# Patient Record
Sex: MALE | Race: WHITE | HISPANIC OR LATINO | Employment: FULL TIME | URBAN - METROPOLITAN AREA
[De-identification: names, ages, dates, MRNs, and addresses within clinical notes are randomized per-mention and may not be internally consistent; named-entity substitution may affect disease eponyms.]

---

## 2017-11-14 ENCOUNTER — ALLSCRIPTS OFFICE VISIT (OUTPATIENT)
Dept: OTHER | Facility: OTHER | Age: 32
End: 2017-11-14

## 2017-11-30 ENCOUNTER — ALLSCRIPTS OFFICE VISIT (OUTPATIENT)
Dept: OTHER | Facility: OTHER | Age: 32
End: 2017-11-30

## 2017-12-05 NOTE — PROGRESS NOTES
Assessment    1  Tear of left biceps muscle, subsequent encounter (V58 89,840 8) (S46 112D)    Plan  Tear of left biceps muscle, subsequent encounter    · *1 - [facility]  *  Status: Active  Requested for:  94PEV6788  Care Summary provided  : Yes    Discussion/Summary    Needs stat referral to ortho surgical    Get MRI report to orthosurgical    Possible side effects of new medications were reviewed with the patient/guardian today  The treatment plan was reviewed with the patient/guardian  The patient/guardian understands and agrees with the treatment plan      Chief Complaint  Pt here for ER follow up for possibly Left Arm torn biceps      History of Present Illness  The patient is being seen for follow-up from an emergency room visit for shoulder sprain  was in and forearm flexion position for 45 min to one hour  Reached for a magazine and felt a lot of pooping sounds  Had immediate pain  Review of Systems    Cardiovascular: no chest pain  Respiratory: no cough  Musculoskeletal: joint swelling, but as noted in HPI  ROS reviewed  Active Problems    1  Abdominal bloating (787 3) (R14 0)   2  Abnormal weight gain (783 1) (R63 5)   3  Anxiety (300 00) (F41 9)   4  Arm pain, diffuse, right (729 5) (M79 601)   5  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)   6  Back pain (724 5) (M54 9)   7  Depression (311) (F32 9)   8  Dermatitis (692 9) (L30 9)   9  Disturbance of memory (780 93) (R41 3)   10  Flu vaccine need (V04 81) (Z23)   11  Insomnia (780 52) (G47 00)   12  Knee pain (719 46) (M25 569)   13  Melena (578 1) (K92 1)   14  Nausea without vomiting (787 02) (R11 0)   15  Need for Tdap vaccination (V06 1) (Z23)   16  Pain of both elbows (719 42) (M25 521,M25 522)   17  Screen for STD (sexually transmitted disease) (V74 5) (Z11 3)    Past Medical History    1  History of Pharyngitis, acute (462) (J02 9)   2   History of Shoulder pain (719 41) (M25 519)    The active problems and past medical history were reviewed and updated today  Surgical History    1  History of Ear Pressure Equalization Tube, Insertion   2  History of Hernia Repair    The surgical history was reviewed and updated today  Family History  Father    1  No pertinent family history    The family history was reviewed and updated today  Social History    · Caffeine use (V49 89) (F15 90)   · Exercises regularly   · Denied: History of domestic violence (V15 41)   · Never a smoker   · Social alcohol use (Z78 9)  The social history was reviewed and is unchanged  Current Meds   1  LamoTRIgine 100 MG Oral Tablet; take 1 tablet twice a day; Therapy: 94HIS0151 to (Evaluate:46Zrt1611)  Requested for: 55QTY2343; Last   Rx:12Ijl9171 Ordered    The medication list was reviewed and updated today  Allergies    1  No Known Drug Allergies    2  No Known Food Allergies    Vitals  Vital Signs    Recorded: 00MXN4180 02:18PM   Temperature 97 6 F, Temporal   Heart Rate 86   Respiration 16   Systolic 450, Sitting   Diastolic 86, Sitting   Height 6 ft 2 in   Weight 283 lb 4 oz   BMI Calculated 36 37   BSA Calculated 2 52   Pain Scale 4     Physical Exam    Constitutional   General appearance: No acute distress, well appearing and well nourished  Pulmonary   Respiratory effort: No increased work of breathing or signs of respiratory distress  Auscultation of lungs: Clear to auscultation, equal breath sounds bilaterally, no wheezes, no rales, no rhonci  Cardiovascular   Auscultation of heart: Normal rate and rhythm, normal S1 and S2, without murmurs  Musculoskeletal   Digits and nails: Normal without clubbing or cyanosis  Inspection/palpation of joints, bones, and muscles: Abnormal   Appearance - swelling (left bicep), ecchymosis (left bicep) and deformity (left bicep)  Palpation - tenderness (left bicep )   Left biceps examination deferred to ortho surgical    Psychiatric   Orientation to person, place and time: Normal     Mood and affect: Normal          Signatures   Electronically signed by : Fabrice Matute; Nov 30 2017  2:55PM EST                       (Author)

## 2017-12-14 ENCOUNTER — ALLSCRIPTS OFFICE VISIT (OUTPATIENT)
Dept: OTHER | Facility: OTHER | Age: 32
End: 2017-12-14

## 2017-12-14 DIAGNOSIS — S46.212A STRAIN OF MUSCLE, FASCIA AND TENDON OF OTHER PARTS OF BICEPS, LEFT ARM, INITIAL ENCOUNTER: ICD-10-CM

## 2017-12-15 NOTE — PROGRESS NOTES
Assessment  1  Traumatic rupture of left distal biceps tendon, initial encounter (841 8) (F56 675T)    Plan  Traumatic rupture of left distal biceps tendon, initial encounter    · Follow Up After Surgery Evaluation and Treatment  Follow-up  Status: Hold For -Scheduling  Requested for: 80EBJ3957   · *1 - [facility]  2 to 3 times a week for 12 weekslocal modalities at therapist discretionSling x 2 weeks ROM, stretching and strengthing per Distal Bicep Tendon repairthank you  Status: Active  Requested for: 24ANT0626  Care Summary provided  : Yes   · (1) CBC/PLT/DIFF; Status:Active - Retrospective Authorization; Requestedfor:00Tfr7910;     Discussion/Summary    The patient has a high-grade partial tear of his left distal biceps and is indicated for open repair  He will be scheduled for left distal biceps repair at his convenience, informed consent was obtained today after thorough discussion risks and benefits the procedure as well as alternatives to the procedure  History of Present Illness  HPI: Tim Ott is a 32 y/o LHD male who presents to the office with a chief complaint of left elbow pain  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  He went to end the arm quickly and felt a pop  ER diagnosed him with a distal bicep rupture  Further testing confirmed diagnosis  He is able to move the elbow with pain  Denies pain at night  He denies any issue with the left elbow prior to this work related accident  Review of Systems   Constitutional: No fever or chills, feels well, no tiredness, no recent weight loss or weight gain  Cardiovascular: No complaints of chest pain, no palpitations, no leg claudication or lower extremity edema  Respiratory: No complaints of shortness of breath, no wheezing, no cough  Gastrointestinal: No complaints of abdominal pain, no constipation, no nausea or vomiting, no diarrhea or bloody stools    Genitourinary: No complaints of dysuria or incontinence, no hesitancy, no nocturia  Musculoskeletal: as noted in HPI  Integumentary: No complaints of skin rash or lesion, no itching or dry skin, no skin wounds  Neurological: No complaints of headache, no confusion, no numbness or tingling, no dizziness  ROS reviewed  Active Problems  1  Abdominal bloating (787 3) (R14 0)   2  Abnormal weight gain (783 1) (R63 5)   3  Anxiety (300 00) (F41 9)   4  Arm pain, diffuse, right (729 5) (M79 601)   5  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)   6  Back pain (724 5) (M54 9)   7  Depression (311) (F32 9)   8  Dermatitis (692 9) (L30 9)   9  Disturbance of memory (780 93) (R41 3)   10  Flu vaccine need (V04 81) (Z23)   11  Insomnia (780 52) (G47 00)   12  Knee pain (719 46) (M25 569)   13  Melena (578 1) (K92 1)   14  Nausea without vomiting (787 02) (R11 0)   15  Need for Tdap vaccination (V06 1) (Z23)   16  Pain of both elbows (719 42) (M25 521,M25 522)   17  Screen for STD (sexually transmitted disease) (V74 5) (Z11 3)   18  Tear of left biceps muscle, subsequent encounter (V58 89,840 8) (S46 112D)   19  Traumatic rupture of left distal biceps tendon, initial encounter (841 8) (C18 647E)    Past Medical History   · History of Pharyngitis, acute (462) (J02 9)   · History of Shoulder pain (719 41) (M25 519)    The active problems and past medical history were reviewed and updated today  Surgical History   · History of Ear Pressure Equalization Tube, Insertion   · History of Hernia Repair    The surgical history was reviewed and updated today  Family History  Father    · No pertinent family history    The family history was reviewed and updated today  Social History   · Caffeine use (V49 89) (F15 90)   · Exercises regularly   · Denied: History of domestic violence (V15 41)   · Never a smoker   · Social alcohol use (Z78 9)  The social history was reviewed and updated today  Current Meds   1   Ibuprofen 200 MG Oral Tablet Recorded   2  LamoTRIgine 100 MG Oral Tablet; take 1 tablet twice a day; Therapy: 92KMC9679 to (Evaluate:72Vdk3399)  Requested for: 54QGI2582; Last Rx:07Jun2017 Ordered    The medication list was reviewed and updated today  Allergies  1  No Known Drug Allergies  2  No Known Food Allergies    Vitals   Recorded: 47FDY9958 09:54AM   Heart Rate 73   Systolic 715, Sitting   Diastolic 83, Sitting   Weight 296 lb    BMI Calculated 38   BSA Calculated 2 57     Physical Exam    Left Elbow: Appearance: ecchymosis, but-- no effusion-- and-- no erythema  Tenderness: radial head  Palpatory findings include a distal biceps defect  ROM: Full  Pronation: painful 4/5  Supination: painful 4/5  Special Tests: + active biceps test    Left Shoulder: no deformity, erythema, ecchymosis, edema, or tenderness,-- ROM within normal limits in all planes-- and-- strength within normal limits in all planes  Constitutional - General appearance: Normal   Musculoskeletal - Muscle strength/tone: Normal -- Upper extremity compartments: Normal   Cardiovascular - Pulses: Normal   Abdomen - Abdomen - Soft, nontender, nondistended  Neurologic - Sensation: Normal -- Upper extremity peripheral neuro exam: Normal   Psychiatric - Orientation to person, place, and time: Normal -- Mood and affect: Normal   Eyes  Pupils and irises: Normal    Free Text - Heart: no discernible arrhythmia, +S1, K9vbekc: No audible wheezing or stridor; bilateral chest rise  Results/Data  I personally reviewed the films/images/results in the office today  My interpretation follows  MRI Review MRI left elbow near complete rupture of the distal biceps  Message  Return to work or school:   Vicky Osuna is under my professional care  He was seen in my office on 12/14/2017       Hillary Mancera will be having surgery on his left elbow  Expected initial time off of work will be between 4-6 weeks depending on acute recovery   He may be able to return with restrictions between 4 and 6 weeks  Expected return to full duty 3 to 4 5 months  Nhung Singleton PA-C  Future Appointments    Date/Time Provider Specialty Site   12/26/2017 08:30 AM [facility] DELIA Welch   [address] [place] [address] OR   12/28/2017 10:00 AM Carlo Camarena, [facility] Orthopedic Surgery [address]     Signatures   Electronically signed by : ALMA DELIA Dixon ; Dec 14 2017 11:57AM EST                       (Author)

## 2017-12-21 ENCOUNTER — APPOINTMENT (OUTPATIENT)
Dept: PHYSICAL THERAPY | Facility: CLINIC | Age: 32
End: 2017-12-21
Payer: OTHER MISCELLANEOUS

## 2017-12-21 DIAGNOSIS — S46.212A STRAIN OF MUSCLE, FASCIA AND TENDON OF OTHER PARTS OF BICEPS, LEFT ARM, INITIAL ENCOUNTER: ICD-10-CM

## 2017-12-21 PROCEDURE — G8990 OTHER PT/OT CURRENT STATUS: HCPCS

## 2017-12-21 PROCEDURE — G8991 OTHER PT/OT GOAL STATUS: HCPCS

## 2017-12-21 PROCEDURE — 97162 PT EVAL MOD COMPLEX 30 MIN: CPT

## 2017-12-26 ENCOUNTER — ANESTHESIA (OUTPATIENT)
Dept: PERIOP | Facility: HOSPITAL | Age: 32
End: 2017-12-26
Payer: OTHER MISCELLANEOUS

## 2017-12-26 ENCOUNTER — HOSPITAL ENCOUNTER (OUTPATIENT)
Facility: HOSPITAL | Age: 32
Setting detail: OUTPATIENT SURGERY
Discharge: HOME | End: 2017-12-26
Attending: ORTHOPAEDIC SURGERY | Admitting: ORTHOPAEDIC SURGERY
Payer: OTHER MISCELLANEOUS

## 2017-12-26 ENCOUNTER — GENERIC CONVERSION - ENCOUNTER (OUTPATIENT)
Dept: OTHER | Facility: OTHER | Age: 32
End: 2017-12-26

## 2017-12-26 ENCOUNTER — ANESTHESIA EVENT (OUTPATIENT)
Dept: PERIOP | Facility: HOSPITAL | Age: 32
End: 2017-12-26
Payer: OTHER MISCELLANEOUS

## 2017-12-26 ENCOUNTER — HOSPITAL ENCOUNTER (OUTPATIENT)
Dept: RADIOLOGY | Facility: HOSPITAL | Age: 32
Setting detail: OUTPATIENT SURGERY
Discharge: HOME | End: 2017-12-26
Payer: OTHER MISCELLANEOUS

## 2017-12-26 VITALS
RESPIRATION RATE: 16 BRPM | HEIGHT: 74 IN | SYSTOLIC BLOOD PRESSURE: 113 MMHG | DIASTOLIC BLOOD PRESSURE: 59 MMHG | TEMPERATURE: 99 F | HEART RATE: 51 BPM | OXYGEN SATURATION: 97 % | BODY MASS INDEX: 36.57 KG/M2 | WEIGHT: 285 LBS

## 2017-12-26 VITALS — DIASTOLIC BLOOD PRESSURE: 76 MMHG | SYSTOLIC BLOOD PRESSURE: 122 MMHG | OXYGEN SATURATION: 99 % | HEART RATE: 70 BPM

## 2017-12-26 DIAGNOSIS — S46.212A STRAIN OF MUSCLE, FASCIA AND TENDON OF OTHER PARTS OF BICEPS, LEFT ARM, INITIAL ENCOUNTER: ICD-10-CM

## 2017-12-26 DIAGNOSIS — S46.212D RUPTURE OF DISTAL BICEPS TENDON, LEFT, SUBSEQUENT ENCOUNTER: Primary | ICD-10-CM

## 2017-12-26 LAB
HBV SURFACE AG SER QL: NORMAL
HCV AB SER QL: NORMAL
HIV 1+2 AB+HIV1 P24 AG SERPL QL IA: NORMAL
HIV1 P24 AG SER QL: NORMAL

## 2017-12-26 PROCEDURE — 86803 HEPATITIS C AB TEST: CPT | Performed by: ORTHOPAEDIC SURGERY

## 2017-12-26 PROCEDURE — 87340 HEPATITIS B SURFACE AG IA: CPT | Performed by: ORTHOPAEDIC SURGERY

## 2017-12-26 PROCEDURE — 87806 HIV AG W/HIV1&2 ANTB W/OPTIC: CPT | Performed by: ORTHOPAEDIC SURGERY

## 2017-12-26 PROCEDURE — C1713 ANCHOR/SCREW BN/BN,TIS/BN: HCPCS | Performed by: ORTHOPAEDIC SURGERY

## 2017-12-26 PROCEDURE — 73070 X-RAY EXAM OF ELBOW: CPT

## 2017-12-26 DEVICE — SYSTEM IMPLANT DSTL BICEP REPAIR: Type: IMPLANTABLE DEVICE | Site: ARM | Status: FUNCTIONAL

## 2017-12-26 RX ORDER — LIDOCAINE HYDROCHLORIDE 10 MG/ML
INJECTION, SOLUTION INFILTRATION; PERINEURAL AS NEEDED
Status: DISCONTINUED | OUTPATIENT
Start: 2017-12-26 | End: 2017-12-26 | Stop reason: SURG

## 2017-12-26 RX ORDER — ONDANSETRON 2 MG/ML
4 INJECTION INTRAMUSCULAR; INTRAVENOUS EVERY 6 HOURS PRN
Status: DISCONTINUED | OUTPATIENT
Start: 2017-12-26 | End: 2017-12-26 | Stop reason: HOSPADM

## 2017-12-26 RX ORDER — OXYCODONE HYDROCHLORIDE 5 MG/1
5 TABLET ORAL EVERY 4 HOURS PRN
Status: DISCONTINUED | OUTPATIENT
Start: 2017-12-26 | End: 2017-12-26 | Stop reason: HOSPADM

## 2017-12-26 RX ORDER — SODIUM CHLORIDE, SODIUM LACTATE, POTASSIUM CHLORIDE, CALCIUM CHLORIDE 600; 310; 30; 20 MG/100ML; MG/100ML; MG/100ML; MG/100ML
20 INJECTION, SOLUTION INTRAVENOUS CONTINUOUS
Status: DISCONTINUED | OUTPATIENT
Start: 2017-12-26 | End: 2017-12-26

## 2017-12-26 RX ORDER — SODIUM CHLORIDE, SODIUM LACTATE, POTASSIUM CHLORIDE, CALCIUM CHLORIDE 600; 310; 30; 20 MG/100ML; MG/100ML; MG/100ML; MG/100ML
125 INJECTION, SOLUTION INTRAVENOUS CONTINUOUS
Status: DISCONTINUED | OUTPATIENT
Start: 2017-12-26 | End: 2017-12-26 | Stop reason: HOSPADM

## 2017-12-26 RX ORDER — ACETAMINOPHEN 325 MG/1
650 TABLET ORAL EVERY 6 HOURS PRN
Status: DISCONTINUED | OUTPATIENT
Start: 2017-12-26 | End: 2017-12-26 | Stop reason: HOSPADM

## 2017-12-26 RX ORDER — MAGNESIUM HYDROXIDE 1200 MG/15ML
LIQUID ORAL AS NEEDED
Status: DISCONTINUED | OUTPATIENT
Start: 2017-12-26 | End: 2017-12-26 | Stop reason: HOSPADM

## 2017-12-26 RX ORDER — FENTANYL CITRATE 50 UG/ML
INJECTION, SOLUTION INTRAMUSCULAR; INTRAVENOUS
Status: COMPLETED
Start: 2017-12-26 | End: 2017-12-26

## 2017-12-26 RX ORDER — ONDANSETRON 2 MG/ML
INJECTION INTRAMUSCULAR; INTRAVENOUS AS NEEDED
Status: DISCONTINUED | OUTPATIENT
Start: 2017-12-26 | End: 2017-12-26 | Stop reason: SURG

## 2017-12-26 RX ORDER — MIDAZOLAM HYDROCHLORIDE 1 MG/ML
INJECTION INTRAMUSCULAR; INTRAVENOUS
Status: COMPLETED
Start: 2017-12-26 | End: 2017-12-26

## 2017-12-26 RX ORDER — GLYCOPYRROLATE 0.2 MG/ML
INJECTION INTRAMUSCULAR; INTRAVENOUS AS NEEDED
Status: DISCONTINUED | OUTPATIENT
Start: 2017-12-26 | End: 2017-12-26 | Stop reason: SURG

## 2017-12-26 RX ORDER — PROPOFOL 10 MG/ML
INJECTION, EMULSION INTRAVENOUS AS NEEDED
Status: DISCONTINUED | OUTPATIENT
Start: 2017-12-26 | End: 2017-12-26 | Stop reason: SURG

## 2017-12-26 RX ORDER — LAMOTRIGINE 100 MG/1
100 TABLET ORAL 2 TIMES DAILY
COMMUNITY
End: 2018-06-14 | Stop reason: SDUPTHER

## 2017-12-26 RX ORDER — FENTANYL CITRATE 50 UG/ML
INJECTION, SOLUTION INTRAMUSCULAR; INTRAVENOUS AS NEEDED
Status: DISCONTINUED | OUTPATIENT
Start: 2017-12-26 | End: 2017-12-26 | Stop reason: SURG

## 2017-12-26 RX ORDER — ONDANSETRON 2 MG/ML
4 INJECTION INTRAMUSCULAR; INTRAVENOUS ONCE AS NEEDED
Status: DISCONTINUED | OUTPATIENT
Start: 2017-12-26 | End: 2017-12-26 | Stop reason: HOSPADM

## 2017-12-26 RX ORDER — MIDAZOLAM HYDROCHLORIDE 1 MG/ML
INJECTION INTRAMUSCULAR; INTRAVENOUS AS NEEDED
Status: DISCONTINUED | OUTPATIENT
Start: 2017-12-26 | End: 2017-12-26 | Stop reason: SURG

## 2017-12-26 RX ORDER — OXYCODONE HYDROCHLORIDE 5 MG/1
TABLET ORAL
Qty: 30 TABLET | Refills: 0 | Status: SHIPPED | OUTPATIENT
Start: 2017-12-26 | End: 2018-05-11

## 2017-12-26 RX ORDER — OXYCODONE HYDROCHLORIDE 10 MG/1
10 TABLET ORAL EVERY 4 HOURS PRN
Status: DISCONTINUED | OUTPATIENT
Start: 2017-12-26 | End: 2017-12-26 | Stop reason: HOSPADM

## 2017-12-26 RX ORDER — FENTANYL CITRATE/PF 50 MCG/ML
25 SYRINGE (ML) INJECTION
Status: DISCONTINUED | OUTPATIENT
Start: 2017-12-26 | End: 2017-12-26 | Stop reason: HOSPADM

## 2017-12-26 RX ADMIN — PROPOFOL 300 MG: 10 INJECTION, EMULSION INTRAVENOUS at 10:50

## 2017-12-26 RX ADMIN — MIDAZOLAM HYDROCHLORIDE 2 MG: 1 INJECTION, SOLUTION INTRAMUSCULAR; INTRAVENOUS at 10:31

## 2017-12-26 RX ADMIN — SODIUM CHLORIDE, SODIUM LACTATE, POTASSIUM CHLORIDE, AND CALCIUM CHLORIDE: .6; .31; .03; .02 INJECTION, SOLUTION INTRAVENOUS at 11:06

## 2017-12-26 RX ADMIN — DEXAMETHASONE SODIUM PHOSPHATE 10 MG: 10 INJECTION INTRAMUSCULAR; INTRAVENOUS at 10:50

## 2017-12-26 RX ADMIN — GLYCOPYRROLATE 0.2 MG: 0.2 INJECTION, SOLUTION INTRAMUSCULAR; INTRAVENOUS at 10:50

## 2017-12-26 RX ADMIN — SODIUM CHLORIDE, SODIUM LACTATE, POTASSIUM CHLORIDE, AND CALCIUM CHLORIDE: .6; .31; .03; .02 INJECTION, SOLUTION INTRAVENOUS at 10:00

## 2017-12-26 RX ADMIN — FENTANYL CITRATE 50 MCG: 50 INJECTION, SOLUTION INTRAMUSCULAR; INTRAVENOUS at 10:31

## 2017-12-26 RX ADMIN — FENTANYL CITRATE 50 MCG: 50 INJECTION, SOLUTION INTRAMUSCULAR; INTRAVENOUS at 11:05

## 2017-12-26 RX ADMIN — CEFAZOLIN SODIUM 2000 MG: 2 SOLUTION INTRAVENOUS at 10:55

## 2017-12-26 RX ADMIN — LIDOCAINE HYDROCHLORIDE 50 MG: 10 INJECTION, SOLUTION INFILTRATION; PERINEURAL at 10:50

## 2017-12-26 RX ADMIN — ONDANSETRON 4 MG: 2 INJECTION INTRAMUSCULAR; INTRAVENOUS at 10:50

## 2017-12-26 RX ADMIN — OXYCODONE HYDROCHLORIDE 10 MG: 10 TABLET ORAL at 13:08

## 2017-12-26 RX ADMIN — CEFAZOLIN SODIUM 1000 MG: 1 SOLUTION INTRAVENOUS at 10:55

## 2017-12-26 NOTE — ANESTHESIA PROCEDURE NOTES
Peripheral Block    Patient location during procedure: holding area  Start time: 12/26/2017 10:20 AM  Reason for block: at surgeon's request and post-op pain management  Staffing  Anesthesiologist: Betsy Turner  Performed: anesthesiologist   Preanesthetic Checklist  Completed: patient identified, site marked, surgical consent, pre-op evaluation, timeout performed, IV checked, risks and benefits discussed and monitors and equipment checked  Peripheral Block  Patient position: supine  Prep: ChloraPrep  Patient monitoring: continuous pulse ox and frequent blood pressure checks  Block type: supraclavicular  Laterality: left  Injection technique: single-shot  Procedures: ultrasound guided  ultrasound permanent image saved    Local infiltration: ropivacaine (1:400K epi)  Infiltration strength: 0 5 %  Dose: 20 mL  Needle  Needle type: Stimuplex   Needle gauge: 22 G  Needle length: 5 cm  Needle localization: ultrasound guidance  Assessment  Injection assessment: incremental injection, local visualized surrounding nerve on ultrasound, negative aspiration for CSF, negative aspiration for heme and no paresthesia on injection  Heart rate change: no  Slow fractionated injection: yes  Post-procedure:  site cleaned  patient tolerated the procedure well with no immediate complications

## 2017-12-26 NOTE — DISCHARGE INSTRUCTIONS
Discharge Instructions - Orthopedics  [facility] 33 y o  male MRN: [id number]  Unit/Bed#: APU 16    Weight Bearing Status:                                           No bearing weight on left arm  Left arm in sling  Okay to move left SHOULDER, okay to gently move elbow   Do not lift or hold anything in the left hand    Pain:  Continue analgesics as directed  · Ice (20 minutes on and 20 minutes off)  · Tylenol (2 pills every 8 hours)  · Ibuprofen (600 mg every 6 hours)  · Oxycodone - for severe pain only  Limited supply    Dressing Instructions:   Keep dressing clean, dry and intact until follow up appointment  Do not shower until post-operative day #4    PT/OT:  Continue PT/OT on outpatient basis as directed    Appt Instructions: If you do not have your appointment, please call the clinic at [phone] to f/u with Dr Emilee Ortiz as scheduled    Contact the office sooner if you experience any increased numbness/tingling in the extremities, fever greater than 102, uncontrolled pain with the above recommendations

## 2017-12-26 NOTE — ANESTHESIA POSTPROCEDURE EVALUATION
Post-Op Assessment Note      CV Status:  Stable    Mental Status:  Alert and awake    Hydration Status:  Euvolemic    PONV Controlled:  Controlled    Airway Patency:  Patent    Post Op Vitals Reviewed: Yes          Staff: CRNA, Anesthesiologist           /78 (12/26/17 1215)    Temp   99 4   Pulse (!) 52 (12/26/17 1215)   Resp 12 (12/26/17 1215)    SpO2 99 % (12/26/17 1215)

## 2017-12-26 NOTE — ANESTHESIA PREPROCEDURE EVALUATION
Review of Systems/Medical History    Chart reviewed  No history of anesthetic complications     Cardiovascular  Negative cardio ROS Exercise tolerance: good, Exercise comment: METS>4    Pulmonary  Negative pulmonary ROS ,        GI/Hepatic  Negative GI/hepatic ROS          Negative  ROS        Endo/Other  Negative endo/other ROS      GYN       Hematology  Negative hematology ROS      Musculoskeletal  Negative musculoskeletal ROS        Neurology  Negative neurology ROS      Psychology   Anxiety,            Physical Exam    Airway    Mallampati score: I  TM Distance: >3 FB  Neck ROM: full     Dental   No notable dental hx     Cardiovascular  Comment: Negative ROS,     Pulmonary      Other Findings        Anesthesia Plan  ASA Score- 1     Anesthesia Type- general and regional with ASA Monitors  Additional Monitors:   Airway Plan: LMA  Comment: GA + L supraclav block/catheter  Plan Factors-  Patient did not smoke on day of surgery  Induction- intravenous  Postoperative Plan-     Informed Consent- Anesthetic plan and risks discussed with patient  I personally reviewed this patient with the CRNA  Discussed and agreed on the Anesthesia Plan with the CRNA  Georgette Ormond

## 2017-12-26 NOTE — OP NOTE
OPERATIVE REPORT  PATIENT NAME: Louise Peterson    :  1985  MRN: [id number]  Pt Location: [facility] OR ROOM 15    SURGERY DATE: 2017     SURGEON: Zina Tyson MD     ASSISTANT: Rob Peña PA-C     NOTE: Rob Peña PA-C was present throughout the entire procedure and performed essential assistance with patient prepping, draping, positioning, biceps reduction, wound closure, sterile dressing application and sling application, all under my direct supervision  NOTE: No qualified resident physician was available for assistance    PREOPERATIVE DIAGNOSIS:  Left Distal Biceps Rupture    POSTOPERATIVE DIAGNOSIS:  Same    PROCEDURES:  Left Distal Biceps Repair    ANESTHESIA STAFF: Brooklynn Carrillo MD     ANESTHESIA TYPE: General with LMA with Supraclavicular Block    COMPLICATIONS: None    FINDINGS: High Grade Partial Distal Biceps Rupture    SPECIMEN(S):  None    ESTIMATED BLOOD LOSS: Minimal    INDICATIONS FOR PROCEDURE:  The patient is a 33 y o  male presenting with pain and lack of function secondary to a distal biceps rupture of the left elbow  After a thorough discussion of the risks and benefits of operative and nonoperative care the patient elected for left distal biceps repair  Informed consent was obtained in the office  OPERATIVE TECHNIQUE:  The day of surgery I identified the patient's left elbow and marked it with my initials  The patient was taken back to the operating room where general anesthesia  was induced by the anesthesia staff without complication  Regional block was preformed in the pre-op holding area by anesthesia under ultrasound guidance for post-op pain control  The patient was placed in the supine position with the left arm on a hand table  The left elbow was prepped and draped in routine sterile fashion and after a time-out for safety and confirming 2 grams of IV Cefazolin were given, a tourniquet was inflated to 225 mmHg with total time 31 minutes    A transverse incision distal to the flexion crease was made  Blunt dissection was carried proximally and the distal end of the biceps tendon which had a few fibers still attached to the radius, these were released confirming the high-grade partial tear and the tendon edge was retrieved through the incision  There was moderate tenosynovitis and the tendon was debrided to a stable border  A whipstitch with an Arthrex fiber loop was then placed in the distal biceps tendon  Attention was then turned to the biceps insertion on the radial tuberosity, following along the anatomic tract of the biceps tendon the tuberosity was identified and gentle retraction was performed to expose the bicipital tuberosity for an anatomic positioning of the repair  Care was taken to avoid injury to the neurovascular structures during the approach and retraction  After identifying the radial tuberosity a guidewire was placed bicortically  A 6 5 mm reamer from the Arthrex system was then used to ream the proximal cortex surface to allow for healing, a socket was not drilled, the whipstitch was then placed in the Arthrex biceps button and the button was deployed past the distal cortex and flipped  This was confirmed under fluoroscopy  The biceps tendon was then brought into apposition with the cortex and tied off with a non-sliding knot  The elbow was brought through a range of motion with minimal tension to the repair and no gapping  The area was then irrigated and the subdermal layer with 3-0 Vicryl with 4-0 Monocryl for skin  Sterile dressings and a sling was placed  The patient was awoken  The patient was transported to the recovery room in good condition and will be admitted for post-operative care and physical therapy will be initiated following the standard distal biceps repair protocol      SIGNATURE: Laurie Fish MD  DATE: December 26, 2017  TIME: 11:57 AM

## 2017-12-28 ENCOUNTER — ALLSCRIPTS OFFICE VISIT (OUTPATIENT)
Dept: OTHER | Facility: OTHER | Age: 32
End: 2017-12-28

## 2017-12-29 ENCOUNTER — APPOINTMENT (OUTPATIENT)
Dept: PHYSICAL THERAPY | Facility: CLINIC | Age: 32
End: 2017-12-29
Payer: OTHER MISCELLANEOUS

## 2017-12-29 PROCEDURE — 97110 THERAPEUTIC EXERCISES: CPT

## 2017-12-29 PROCEDURE — 97140 MANUAL THERAPY 1/> REGIONS: CPT

## 2018-01-03 ENCOUNTER — APPOINTMENT (OUTPATIENT)
Dept: PHYSICAL THERAPY | Facility: CLINIC | Age: 33
End: 2018-01-03
Payer: OTHER MISCELLANEOUS

## 2018-01-03 PROCEDURE — 97140 MANUAL THERAPY 1/> REGIONS: CPT

## 2018-01-03 PROCEDURE — 97110 THERAPEUTIC EXERCISES: CPT

## 2018-01-04 ENCOUNTER — APPOINTMENT (OUTPATIENT)
Dept: PHYSICAL THERAPY | Facility: CLINIC | Age: 33
End: 2018-01-04
Payer: OTHER MISCELLANEOUS

## 2018-01-04 PROCEDURE — 97140 MANUAL THERAPY 1/> REGIONS: CPT

## 2018-01-04 PROCEDURE — 97110 THERAPEUTIC EXERCISES: CPT

## 2018-01-05 NOTE — PROGRESS NOTES
Assessment   1  Aftercare following surgery (V58 89) (Z48 89)    Plan   Aftercare following surgery    · Ibuprofen 600 MG Oral Tablet; TAKE 1 TABLET 3 TIMES DAILY   · Follow-up visit in 6 weeks Evaluation and Treatment  Follow-up  Status: Complete  Done:    47QGW3836  Traumatic rupture of left distal biceps tendon, initial encounter    · *1 - [facility]  2 to 3 times a week for 12 weeks    local modalities at therapist discretion    Sling x 2 weeks     No elbow strengthening until 6 weeks    ROM, stretching and strengthing per Distal Bicep Tendon repair    thank you  Status: Active  Requested for: 79ENU6769  Care Summary provided  : Yes    Discussion/Summary      Andrés Camarena Is doing well postoperatively  He will start physical therapy tomorrow  We will check his progress in 6 weeks  He will remain out of work until we see him back in the office  If he has any issues, questions or concerns coming call the office  All questions answered  Refill of ibuprofen was given for pain relief  Post-Op   Post-Op Elbow St ke:      Marcelyn Aase is status post distal biceps repair of the left elbow on 12/26/2017  HPI: The patient reports no excessive pain,-- no swelling,-- no fever,-- no nausea,-- no excessive bleeding-- and-- no shortness of breath  PE: The surgical incision site was clean, dry and intact  The elbow demonstrates ecchymosis-- and-- tenderness, but-- no warmth,-- no induration,-- no erythema-- and-- no swelling  ROM as expected given post-op status  Peripheral neurovascular exam reveals intact sensation to light touch-- and-- intact gross motor function  Assessment: Post-op, the patient is doing well,-- has excellent pain control-- and-- is showing no signs of infection  Plan: Activity Restrictions: advance as tolerated-- and-- per protocol  PT Referral:  I hereby certify that these services are medically necessary  Follow up: 6 weeks        Active Problems   1  Abdominal bloating (787 3) (R14 0)   2  Abnormal weight gain (783 1) (R63 5)   3  Aftercare following surgery (V58 89) (Z48 89)   4  Anxiety (300 00) (F41 9)   5  Arm pain, diffuse, right (729 5) (M79 601)   6  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)   7  Back pain (724 5) (M54 9)   8  Depression (311) (F32 9)   9  Dermatitis (692 9) (L30 9)   10  Disturbance of memory (780 93) (R41 3)   11  Flu vaccine need (V04 81) (Z23)   12  Insomnia (780 52) (G47 00)   13  Knee pain (719 46) (M25 569)   14  Melena (578 1) (K92 1)   15  Nausea without vomiting (787 02) (R11 0)   16  Need for Tdap vaccination (V06 1) (Z23)   17  Pain of both elbows (719 42) (M25 521,M25 522)   18  Screen for STD (sexually transmitted disease) (V74 5) (Z11 3)   19  Tear of left biceps muscle, subsequent encounter (V58 89,840 8) (S46 112D)   20  Traumatic rupture of left distal biceps tendon, initial encounter (841 8) (V81 823U)    Social History    · Caffeine use (V49 89) (F15 90)   · Exercises regularly   · Denied: History of domestic violence (V15 41)   · Never a smoker   · Social alcohol use (Z78 9)    Current Meds    1  LamoTRIgine 100 MG Oral Tablet; take 1 tablet twice a day; Therapy: 86FHZ5209 to (Evaluate:50Qrj8243)  Requested for: 99QFI5214; Last     Rx:07Jun2017 Ordered   2  OxyCODONE HCl TABS; Therapy: (Recorded:03Ggr1967) to Recorded    Allergies   1  No Known Drug Allergies  2  No Known Food Allergies    Vitals    Recorded: 28Dec2017 10:15AM   Heart Rate 64   Systolic 078, Sitting   Diastolic 83, Sitting   Weight 310 lb 4 oz   BMI Calculated 39 83   BSA Calculated 2 62     Message   Return to work or school:    William Bhatt is under my professional care  He was seen in my office on 12/28/2017     He is not able to return to work until cleared by orthopedics      Sabas Veliz will remain out of work over this next 6 weeks until we see him back in the office for re-evaluation        José Howard PA-C  Future Appointments      Date/Time Provider Specialty Site   02/08/2018 05:00 PM ALMA DELIA Ramsey   Orthopedic Surgery [facility]     Signatures    Electronically signed by : Jack Venegas [facility]; Dec 28 2017 10:49AM EST                       (Author)     Electronically signed by : ALMA DELIA Kang ; Jan 4 2018 10:23AM EST                       (Author)

## 2018-01-09 ENCOUNTER — APPOINTMENT (OUTPATIENT)
Dept: PHYSICAL THERAPY | Facility: CLINIC | Age: 33
End: 2018-01-09
Payer: OTHER MISCELLANEOUS

## 2018-01-09 PROCEDURE — 97110 THERAPEUTIC EXERCISES: CPT

## 2018-01-09 PROCEDURE — 97140 MANUAL THERAPY 1/> REGIONS: CPT

## 2018-01-10 NOTE — MISCELLANEOUS
Message  Return to work or school:   Carrie Vega is under my professional care  He was seen in my office on 11/14/2017   He is able to return to work on  11/15/2017    He is able to work with limitations (8 hrs a day/40 hrs week)           Signatures   Electronically signed by : Kat Moscoso DO; Nov 21 2017 11:27AM EST                       (Author)

## 2018-01-10 NOTE — MISCELLANEOUS
Message  Return to work or school:   Alicia Rodriguez is under my professional care   He was seen in my office on 11/30/2017   He is able to return to work on  undetermined   He is not able to return to work until evaluated by ortho surgical     Patient has a serious injury which requires medical attention and evaluation by specialist         Signatures   Electronically signed by : Jenny Mcadams; Nov 30 2017  2:56PM EST                       (Author)

## 2018-01-11 ENCOUNTER — APPOINTMENT (OUTPATIENT)
Dept: PHYSICAL THERAPY | Facility: CLINIC | Age: 33
End: 2018-01-11
Payer: OTHER MISCELLANEOUS

## 2018-01-11 PROCEDURE — 97110 THERAPEUTIC EXERCISES: CPT

## 2018-01-11 PROCEDURE — 97140 MANUAL THERAPY 1/> REGIONS: CPT

## 2018-01-12 ENCOUNTER — GENERIC CONVERSION - ENCOUNTER (OUTPATIENT)
Dept: OTHER | Facility: OTHER | Age: 33
End: 2018-01-12

## 2018-01-12 VITALS
TEMPERATURE: 97.6 F | RESPIRATION RATE: 16 BRPM | DIASTOLIC BLOOD PRESSURE: 86 MMHG | BODY MASS INDEX: 36.35 KG/M2 | WEIGHT: 283.25 LBS | SYSTOLIC BLOOD PRESSURE: 116 MMHG | HEART RATE: 86 BPM | HEIGHT: 74 IN

## 2018-01-13 VITALS
HEIGHT: 74 IN | SYSTOLIC BLOOD PRESSURE: 124 MMHG | RESPIRATION RATE: 18 BRPM | HEART RATE: 78 BPM | TEMPERATURE: 96.6 F | WEIGHT: 283.38 LBS | DIASTOLIC BLOOD PRESSURE: 82 MMHG | BODY MASS INDEX: 36.37 KG/M2

## 2018-01-13 NOTE — MISCELLANEOUS
Message  Return to work or school:   Zafar Lou is under my professional care  He was seen in my office on 6/ 24/16     He is able to work with limitations (8 hour/day start 6/25/16)           Signatures   Electronically signed by : Nathaniel Jimenez DO; Jun 24 2016 10:14AM EST                       (Author)    Electronically signed by : Nathaniel Jimenez DO; Jun 25 2016 11:34AM EST                       (Author)

## 2018-01-16 ENCOUNTER — APPOINTMENT (OUTPATIENT)
Dept: PHYSICAL THERAPY | Facility: CLINIC | Age: 33
End: 2018-01-16
Payer: OTHER MISCELLANEOUS

## 2018-01-16 PROCEDURE — 97140 MANUAL THERAPY 1/> REGIONS: CPT

## 2018-01-16 PROCEDURE — 97110 THERAPEUTIC EXERCISES: CPT

## 2018-01-16 NOTE — MISCELLANEOUS
Message  Return to work or school:   William Bhatt is under my professional care   He was seen in my office on 10/7/16    He is not able to return to work until 10/11/16           Signatures   Electronically signed by : Izaiah Gabriel DO; Oct  7 2016  9:18AM EST                       (Author)    Electronically signed by : Izaiah Gabriel DO; Oct  9 2016  7:41PM EST                       (Author)

## 2018-01-18 ENCOUNTER — APPOINTMENT (OUTPATIENT)
Dept: PHYSICAL THERAPY | Facility: CLINIC | Age: 33
End: 2018-01-18
Payer: OTHER MISCELLANEOUS

## 2018-01-18 PROCEDURE — 97140 MANUAL THERAPY 1/> REGIONS: CPT

## 2018-01-18 PROCEDURE — 97110 THERAPEUTIC EXERCISES: CPT

## 2018-01-22 VITALS
DIASTOLIC BLOOD PRESSURE: 83 MMHG | WEIGHT: 310.25 LBS | HEART RATE: 64 BPM | BODY MASS INDEX: 39.83 KG/M2 | SYSTOLIC BLOOD PRESSURE: 122 MMHG

## 2018-01-22 VITALS
BODY MASS INDEX: 38 KG/M2 | WEIGHT: 296 LBS | SYSTOLIC BLOOD PRESSURE: 133 MMHG | DIASTOLIC BLOOD PRESSURE: 83 MMHG | HEART RATE: 73 BPM

## 2018-01-23 ENCOUNTER — APPOINTMENT (OUTPATIENT)
Dept: PHYSICAL THERAPY | Facility: CLINIC | Age: 33
End: 2018-01-23
Payer: OTHER MISCELLANEOUS

## 2018-01-23 PROCEDURE — 97140 MANUAL THERAPY 1/> REGIONS: CPT

## 2018-01-23 PROCEDURE — 97110 THERAPEUTIC EXERCISES: CPT

## 2018-01-23 NOTE — PROGRESS NOTES
Preliminary Nursing Report                Patient Information    Initial Encounter Entry Date:   2017 10:49 AM EST (Automated Transmission Automated Transmission)       Last Modified:   {Sarina Vernon}              Legal Name: Feliciano Adamson        Social Security Number:        YOB: 1985        Age (years): 33        Gender: M        Body Mass Index (BMI): 38 kg/m2        Height: 74 in  Weight: 296 lbs (134 kgs)           Address:   [address] [place] [address]              Phone: [phone]   (consent to leave messages)        Email:        Ethnicity: Decline to State        Druze:        Marital Status:        Preferred Language: English        Race: Other Race                    Patient Insurance Information        Primary Insurance Information Carrier Name: {Primary  CarrierName}           Carrier Address:   {Primary  CarrierAddress}              Carrier Phone: {Primary  CarrierPhone}          Group Number: {Primary  GroupNumber}          Policy Number: {Primary  PolicyNumber}          Insured Name: {Primary  InsuredName}          Insured : {Primary  InsuredDOB}          Relationship to Insured: {Primary  RelationshiptoInsured}           Secondary Insurance Information Carrier Name: {Secondary  CarrierName}           Carrier Address:   {Secondary  CarrierAddress}              Carrier Phone: {Secondary  CarrierPhone}          Group Number: {Secondary  GroupNumber}          Policy Number: {Secondary  PolicyNumber}          Insured Name: {Secondary  InsuredName}          Insured : {Secondary  InsuredDOB}          Relationship to Insured: {Secondary  RelationshiptoInsured}                       Health Profile   Booking #:   Eva Lindsey #: [id number]               DOS: 2017    Surgery : Repair, tendon or muscle, upper arm or elbow, each tendon or muscle, primary or secondary (excludes rotator cuff)    Add'l Procedures/Notes:     Surgery Risk: Intermediate          Precautions     BMI                   Allergies    No Known Drug Allergies       No Known Food Allergies       Clinical Comments: Reaction Type: , Reaction: , Severity:              Medications    Ibuprofen 200 MG Oral Tablet       LamoTRIgine 100 MG Oral Tablet               Conditions    Abdominal bloating       Abnormal weight gain       Anxiety       Arm pain, diffuse, right       Atypical manic-depressive psychosis       Back pain       Depression       Dermatitis       Disturbance of memory       Flu vaccine need       Insomnia       Knee pain       Melena       Nausea without vomiting       Need for Tdap vaccination       Pain of both elbows       Screen for STD (sexually transmitted disease)       Tear of left biceps muscle, subsequent encounter       Traumatic rupture of left distal biceps tendon, initial encounter               Family History    None             Surgical History    None             Social History    Caffeine use       Denies History of domestic violence       Exercises regularly       Never a smoker       Social alcohol use                               Patient Instructions       Medical Procedure Risk  NPO Instructions   The day before surgery it is recommended to have a light dinner at your usual time and you are allowed a light snack early in the evening  Do not eat anything heavy or eat a big meal after 7pm  Do not eat or drink anything after midnight prior to your surgery  If you are supposed to take any of your medications, do so with a sip of water  Failure to follow these instructions can lead to an increased risk of lung complications and may result in a delay or cancellation of your procedure  If you have any questions, contact your institution for further instructions   No candy, no gum, no mints, no chewing tobacco          LamoTRIgine 100 MG Oral Tablet  Medication Instruction (Neurological Medication) 8  Please continue to take this medication on your normal schedule  If this is an oral medication and you take in the morning, you may do so with a sip of water  Ibuprofen 200 MG Oral Tablet  Medication Instruction (NSAID - Pain Medication) 61  Please stop ibuprofen, naproxen and other non-steroidal anti-inflammatory drugs (NSAIDS) for 24 hrs before surgery  Testing Considerations       ? Complete Blood Count (CBC) t  If test was completed and normal within last six months, repeat test is not necessary  Triggered by: Melena               Consultations       ? Primary Care Physician Evaluation   Primary care physician may need to evaluate patient prior to surgery  This is likely NOT necessary if the listed conditions are chronic and stable  Triggered by: Melena               Miscellaneous Questions         Question: Are you able to walk up a flight of stairs, walk up a hill or do heavy housework WITHOUT having chest pain or shortness of breath? Answer: YES                   Allergies/Conditions/Medications Not Found        The following were not recognized by our system when generating the recommendations  Please consider if this would impact any preoperative protocols  ? Caffeine use       ? Denies History of domestic violence       ? Exercises regularly       ? Never a smoker       ? Social alcohol use       ? Tear of left biceps muscle, subsequent encounter                  Appointment Information         Date:    12/26/2017        Location:    [place]        Address:           Directions:                      Footnotes revision 14      ?? Denotes a free-text entry  Legal Disclaimer: Any and all recommendations and services provided herein are designed to assist in the preoperative care of the patient  Nothing contained herein is designed to replace, eliminate or alleviate the responsibility of the attending physician to supervise and determine the patient?s preoperative care and course of treatment   Failure to provide complete, accurate information may negatively impact the system?s ability to recommend the proper preoperative protocol  THE ATTENDING PHYSICIAN IS RESPONSIBLE TO REVIEW THE SUGGESTED PREOPERATIVE PROTOCOLS/COURSE OF TREATMENT AND PRESCRIBE THE FINAL COURSE OF PREOPERATIVE TREATMENT IN CONSULTATION WITH THE PATIENT  THE ePREOP SYSTEM AND ITS MATERIALS ARE PROVIDED ? AS IS? WITHOUT WARRANTY OF ANY KIND, EXPRESS OR IMPLIED, INCLUDING, BUT NOT LIMITED TO, WARRANTIES OF PERFORMANCE OR MERCHANTABILITY OR FITNESS FOR A PARTICULAR PURPOSE  PATIENT AND PHYSICIANS HEREBY AGREE THAT THEIR USE OF THE MATERIALS AND RESOURCES ACT AS A CONSENT TO RELEASE AND WAIVE ePREOP FROM ANY AND ALL CLAIMS OF WARRANTY, TORT OR CONTRACT LAW OF ANY KIND  Electronically signed by:Joseph CHAVES    Dec 19 2017  4:59PM EST

## 2018-01-23 NOTE — MISCELLANEOUS
Message  Return to work or school:   Alicia Rodriguez is under my professional care  He was seen in my office on 12/14/2017       Viridiana Garcia will be having surgery on his left elbow  Expected initial time off of work will be between 4-6 weeks depending on acute recovery  He may be able to return with restrictions between 4 and 6 weeks  Expected return to full duty 3 to 4 5 months  Sheila Valladares PA-C        Signatures   Electronically signed by : ALMA DELIA Lancaster ; Dec 14 2017 11:57AM EST                       (Author)

## 2018-01-23 NOTE — MISCELLANEOUS
Message  Return to work or school:   Marcelyn Aase is under my professional care  He was seen in my office on 12/28/2017    He is not able to return to work until cleared by orthopedics     Andrés Camarena will remain out of work over this next 6 weeks until we see him back in the office for re-evaluation  Velasquez Medrano PA-C        Future Appointments    Signatures   Electronically signed by : Velasquez Medrano, [facility]; Dec 28 2017 10:49AM EST                       (Author)    Electronically signed by : ALMA DELIA Adorno ; Jan 4 2018 10:23AM EST                       (Author)

## 2018-01-23 NOTE — CONSULTS
Future Appointments    Signatures   Electronically signed by : Kellee Maciel, [facility]; Dec 28 2017 10:49AM EST                       (Author)    Electronically signed by : ALMAD ELIA Ball ; Jan 4 2018 10:23AM EST                       (Author)

## 2018-01-24 NOTE — PROGRESS NOTES
Assessment   1  Insomnia (780 52) (G47 00)1   2  1   3  Anxiety (300 00) (F41 9)1      1 Amended By: Paul Courtney; Jun 25 2016 11:33 AM EST    Plan  Atypical manic-depressive psychosis    · Start: Zolpidem Tartrate 10 MG Oral Tablet; take 1 tablet by mouth once daily at bedtime    Chief Complaint  chest tightness times 1wk/insomnia      History of Present Illness  HPI: insomnia  work incident yesterday  otc mds not working  still anxious wih meds      Review of Systems    Constitutional:1  no fever or chills, feels well, no tiredness, no recent weight loss or weight gain1   ENT:1  no complaints of earache, no loss of hearing, no nosebleeds or nasal discharge, no sore throat or hoarseness1   Cardiovascular: 1  no complaints of slow or fast heart rate, no chest pain, no palpitations, no leg claudication or lower extremity edema1   Respiratory:1  no complaints of shortness of breath, no wheezing or cough, no dyspnea on exertion, no orthopnea or PND1   Gastrointestinal:1  no complaints of abdominal pain, no constipation, no nausea or vomiting, no diarrhea or bloody stools1   Genitourinary:1  no complaints of dysuria or incontinence, no hesitancy, no nocturia, no genital lesion, no inadequacy of penile erection1   Musculoskeletal:1  as noted in [facility]   Integumentary:1  no complaints of skin rash or lesion, no itching or dry skin, no skin wounds1   Neurological:1  no complaints of headache, no confusion, no numbness or tingling, no dizziness or fainting1         1 Amended By: Paul Courtney; Jun 25 2016 11:32 AM EST    Active Problems   1  Abdominal bloating (787 3) (R14 0)  2  Abnormal weight gain (783 1) (R63 5)  3  Arm pain, diffuse, right (729 5) (M79 601)  4  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)  5  Back pain (724 5) (M54 9)  6  Depression (311) (F32 9)  7  Disturbance of memory (780 93) (R41 3)  8  Flu vaccine need (V04 81) (Z23)  9  Knee pain (719 46) (M25 569)  10   Melena (578 1) (K92 1)  11  Nausea without vomiting (787 02) (R11 0)  12  Need for Tdap vaccination (V06 1) (Z23)  13  Pain of both elbows (719 42) (M25 522,M25 521)  14  Screen for STD (sexually transmitted disease) (V74 5) (Z11 3)    Past Medical History   1  History of Pharyngitis, acute (462) (J02 9)  2  History of Shoulder pain (719 41) (M25 519)  Active Problems And Past Medical History Reviewed: The active problems and past medical history were reviewed and updated today  1        1 Amended By: Chandra Gresham; Jun 25 2016 11:34 AM EST    Family History  Father   1  No pertinent family history    Social History    · Caffeine use (V49 89) (F15 90)   · Exercises regularly   · Denied: History of domestic violence (V15 41)   · Never a smoker   · Social alcohol use (Z78 9)    Surgical History   1  History of Ear Pressure Equalization Tube, Insertion  2  History of Hernia Repair    Current Meds  1  Escitalopram Oxalate 20 MG Oral Tablet; take 1 tablet by mouth every day; Therapy: 49XLA6861 to (51-30-20-57)  Requested for: 29Apr2016; Last   Rx:29Apr2016 Ordered  2  LamoTRIgine 100 MG Oral Tablet; Take 1 tablet twice daily; Therapy: 04XLZ9680 to [phone])  Requested for: 04Apr2016; Last   Rx:04Apr2016 Ordered  3  Methocarbamol 500 MG Oral Tablet; 1/2-1 po tid prn muscle spasm; Therapy: 04Apr2016 to (Evaluate:34Zvk8427); Last Rx:04Apr2016 Ordered  4  Nabumetone 750 MG Oral Tablet; TAKE 1 TABLET TWICE DAILY; Therapy: 51JVZ4743 to (Dante Rubinstein)  Requested for: 01PME5885; Last   Rx:91Hdn1996 Ordered    The medication list was reviewed and updated today  Allergies   1  No Known Drug Allergies   2  No Known Food Allergies    Vitals   Recorded: 59TUP0146 09:54AM   Heart Rate 78   Systolic 514   Diastolic 74   Height 6 ft 2 in   Weight 309 lb 9 6 oz   BMI Calculated 39 75   BSA Calculated 2 62     Physical Exam    Constitutional1    General appearance: No acute distress, well appearing and well nourished  1 Psychiatric1    Orientation to person, place and time: Normal 1    Mood and affect: Normal 1          1 Amended By: Isabella Boone; Jun 25 2016 11:33 AM EST    Message  Return to work or school:   Kyra King is under my professional care  He was seen in my office on 6/ 24/16     He is able to work with limitations (8 hour/day start 6/25/16)           Signatures   Electronically signed by : Jacob Suarez DO; Jun 25 2016 11:34AM EST                       (Author)

## 2018-01-30 ENCOUNTER — OFFICE VISIT (OUTPATIENT)
Dept: OBGYN CLINIC | Facility: OTHER | Age: 33
End: 2018-01-30

## 2018-01-30 ENCOUNTER — OFFICE VISIT (OUTPATIENT)
Dept: PHYSICAL THERAPY | Facility: CLINIC | Age: 33
End: 2018-01-30
Payer: OTHER MISCELLANEOUS

## 2018-01-30 VITALS
HEIGHT: 74 IN | DIASTOLIC BLOOD PRESSURE: 85 MMHG | BODY MASS INDEX: 39.66 KG/M2 | WEIGHT: 309 LBS | HEART RATE: 81 BPM | SYSTOLIC BLOOD PRESSURE: 135 MMHG

## 2018-01-30 DIAGNOSIS — S46.212D RUPTURE OF DISTAL BICEPS TENDON, LEFT, SUBSEQUENT ENCOUNTER: Primary | ICD-10-CM

## 2018-01-30 DIAGNOSIS — S46.212D STRAIN OF MUSCLE, FASCIA AND TENDON OF OTHER PARTS OF BICEPS, LEFT ARM, SUBSEQUENT ENCOUNTER: ICD-10-CM

## 2018-01-30 DIAGNOSIS — S46.212A STRAIN OF MUSCLE, FASCIA AND TENDON OF OTHER PARTS OF BICEPS, LEFT ARM, INITIAL ENCOUNTER: Primary | ICD-10-CM

## 2018-01-30 PROCEDURE — 97110 THERAPEUTIC EXERCISES: CPT

## 2018-01-30 PROCEDURE — 97140 MANUAL THERAPY 1/> REGIONS: CPT

## 2018-01-30 PROCEDURE — 99024 POSTOP FOLLOW-UP VISIT: CPT | Performed by: ORTHOPAEDIC SURGERY

## 2018-01-30 NOTE — PROGRESS NOTES
Assessment:       1  Rupture of distal biceps tendon, left, subsequent encounter          Plan:    1  Continue with therapy for left elbow and progress to strengthening next week  2  Work note provided  3  Follow up in 6 weeks      Subjective:     Patient ID: Juarez Alvarenga is a 33 y o  male  Chief Complaint:    Della Lomeli presents the office 5 weeks following left distal biceps repair  He is doing well  He denies any left elbow pain  He is pleased with his progress in physical therapy and plans to initiate strengthening next week  He denies any new injury or trauma since office visit  Social History     Occupational History    Not on file  Social History Main Topics    Smoking status: Never Smoker    Smokeless tobacco: Never Used    Alcohol use Yes      Comment: occasionaly    Drug use: No    Sexual activity: Not on file      Review of Systems   Constitutional: Negative  Respiratory: Negative  Cardiovascular: Negative  Gastrointestinal: Negative  Musculoskeletal:        See HPI   Skin: Negative  allergies, medications, past medical history, past surgical history were reviewed  Objective:     Left Elbow Exam     Tenderness   The patient is experiencing no tenderness  Range of Motion   The patient has normal left elbow ROM  Muscle Strength   Left elbow normal strength: deferred     Other   Erythema: absent  Scars: present (well healed scar)  Sensation: normal  Pulse: present    Comments:  Palpable distal biceps at insertion        Physical Exam   Constitutional: He is oriented to person, place, and time  He appears well-developed and well-nourished  HENT:   Head: Normocephalic  Eyes: EOM are normal    Pulmonary/Chest: No respiratory distress  He has no wheezes  Neurological: He is alert and oriented to person, place, and time  Skin: Skin is warm and dry  Psychiatric: He has a normal mood and affect   His behavior is normal  Judgment and thought content normal          I have personally reviewed pertinent films in PACS

## 2018-01-30 NOTE — PROGRESS NOTES
Daily Note     Today's date: 2018  Patient name: Leisa Hedrick  : 1985  MRN: [id number]  Referring provider: Chase Lyons MD  Dx:   Encounter Diagnoses   Name Primary?  Strain of muscle, fascia and tendon of other parts of biceps, left arm, initial encounter Yes    Strain of muscle, fascia and tendon of other parts of biceps, left arm, subsequent encounter                   Subjective: Pt reports seeing MD today and was released to return to work tomorrow with restrictions  Objective: See treatment diary below  Precautions: biceps repair protocol (Dr Tala Martínez)      Specialty Daily Treatment Diary     Manual         STM to ivana incision and scar, gentle PROM elbow flexion, ext, forearm sup/pron to tolerance                                              Exercise Diary         Ball squeeze  home       LLLD supine isometric elbow ext into towel roll home       Sup/pron stretch home       Elbow AROM with forearm sup, neutral and pronation  x10 each        AROM forearm sup/pronation x20       Closed chain elbow ext at wall 10"x10       Closed chain elbow ext with forearm sup at wall  10"x10                                                                                                                   Modalities                                      Assessment: Tolerated treatment well  Patient exhibited good technique with therapeutic exercises      Plan: Continue per plan of care

## 2018-02-06 ENCOUNTER — OFFICE VISIT (OUTPATIENT)
Dept: FAMILY MEDICINE CLINIC | Facility: CLINIC | Age: 33
End: 2018-02-06
Payer: OTHER MISCELLANEOUS

## 2018-02-06 ENCOUNTER — OFFICE VISIT (OUTPATIENT)
Dept: PHYSICAL THERAPY | Facility: CLINIC | Age: 33
End: 2018-02-06
Payer: OTHER MISCELLANEOUS

## 2018-02-06 VITALS
BODY MASS INDEX: 39.05 KG/M2 | SYSTOLIC BLOOD PRESSURE: 126 MMHG | DIASTOLIC BLOOD PRESSURE: 90 MMHG | HEIGHT: 74 IN | WEIGHT: 304.25 LBS | RESPIRATION RATE: 16 BRPM | HEART RATE: 84 BPM | TEMPERATURE: 97.4 F

## 2018-02-06 DIAGNOSIS — S46.212D STRAIN OF MUSCLE, FASCIA AND TENDON OF OTHER PARTS OF BICEPS, LEFT ARM, SUBSEQUENT ENCOUNTER: ICD-10-CM

## 2018-02-06 DIAGNOSIS — S46.212A STRAIN OF MUSCLE, FASCIA AND TENDON OF OTHER PARTS OF BICEPS, LEFT ARM, INITIAL ENCOUNTER: Primary | ICD-10-CM

## 2018-02-06 DIAGNOSIS — S46.212D RUPTURE OF DISTAL BICEPS TENDON, LEFT, SUBSEQUENT ENCOUNTER: ICD-10-CM

## 2018-02-06 DIAGNOSIS — F51.04 PSYCHOPHYSIOLOGICAL INSOMNIA: Primary | ICD-10-CM

## 2018-02-06 PROCEDURE — 99214 OFFICE O/P EST MOD 30 MIN: CPT | Performed by: FAMILY MEDICINE

## 2018-02-06 PROCEDURE — 97140 MANUAL THERAPY 1/> REGIONS: CPT

## 2018-02-06 PROCEDURE — 97110 THERAPEUTIC EXERCISES: CPT

## 2018-02-06 RX ORDER — QUETIAPINE FUMARATE 50 MG/1
50 TABLET, FILM COATED ORAL
Qty: 15 TABLET | Refills: 0 | Status: SHIPPED | OUTPATIENT
Start: 2018-02-06 | End: 2018-05-11

## 2018-02-06 NOTE — LETTER
to whom it may concern;                                                                                                              2/6/18    Kenneth Green was seen 2/6/2018 in regard to his ruptured lt Biceps tendon  Prior to the reported incident 11/28/17 [facility] records did not support any historical problem with his biceps tendon  After reviewing current medical records, it appears that this event occurred after a time of arm inactivity with the elbow in a flexed position, he picked up heavy article and tossed it into case in an abrupt manner, causing the tendon to rupture  Over extension of tendon of stiffened tendon could cause spontaneous rupture similar to an over extended rubber band  Sincerely      Karthik ANTHONY

## 2018-02-06 NOTE — PROGRESS NOTES
Daily Note     Today's date: 2018  Patient name: Chio Fontaine  : 1985  MRN: [id number]  Referring provider: Ava Atkins MD  Dx:   Encounter Diagnoses   Name Primary?  Strain of muscle, fascia and tendon of other parts of biceps, left arm, initial encounter Yes    Strain of muscle, fascia and tendon of other parts of biceps, left arm, subsequent encounter                   Subjective: Pt reports returning to light duty work 18 with c/o increase pain and numbness left arm  Pt states pain level gradually increased to 9/10 at end of the week with difficulty gripping  Pt states he contacted MD yesterday and is awaiting return call  Pt denies pain today with c/o numbness proximal forearm  Objective: See treatment diary below  Precautions: biceps repair protocol (Dr Pilar Donis)      Specialty Daily Treatment Diary     Manual  /      STM to ivana incision and scar, gentle PROM elbow flexion, ext, forearm sup/pron to tolerance    10 min                                           Exercise Diary         Ball squeeze  home       LLLD supine isometric elbow ext into towel roll home       Sup/pron stretch home       Elbow AROM with forearm sup, neutral and pronation  x10 each  x10 each       AROM forearm sup/pronation x20 x20      Closed chain elbow ext at wall 10"x10 10"x10      Closed chain elbow ext with forearm sup at wall  10"x10 np      tband tricep ex  Green 10"x10      tband b/l ER and lower trap   Red 10"x10 ea      Wrist ext, RD, flexion   2 lb x10 each       Gripper    2x10                                                                                   Modalities                                      Assessment:  No TTP or swelling noted  Minimal pain/tightness end range elbow extension and forearm pronation  Progressed TE per MD protocol without increase pain  Plan: Continue per plan of care

## 2018-02-06 NOTE — PROGRESS NOTES
Assessment/Plan:    No problem-specific Assessment & Plan notes found for this encounter  Diagnoses and all orders for this visit:    Psychophysiological insomnia  -     QUEtiapine (SEROquel) 50 mg tablet; Take 1 tablet (50 mg total) by mouth daily at bedtime          Subjective:      Patient ID: Chelsea Chahal is a 33 y o  male  Here to discuss his insonia poblem  No relie with anjel  Also nees letter for wc case that tendon rupture could have occurred in manner that his ocured            Review of Systems   Psychiatric/Behavioral: Positive for agitation  The patient is nervous/anxious  All other systems reviewed and are negative  Objective:     Physical Exam   Constitutional: He is oriented to person, place, and time  He appears well-developed and well-nourished  HENT:   Head: Normocephalic  Right Ear: Tympanic membrane, external ear and ear canal normal    Left Ear: Tympanic membrane, external ear and ear canal normal    Mouth/Throat: Oropharynx is clear and moist    Eyes: EOM are normal  Pupils are equal, round, and reactive to light  Neck: No thyromegaly present  Cardiovascular: Normal rate, regular rhythm, normal heart sounds and intact distal pulses  Pulmonary/Chest: Breath sounds normal    Abdominal: Soft  He exhibits no mass  There is no tenderness  Musculoskeletal: Normal range of motion  He exhibits no deformity  Lymphadenopathy:     He has no cervical adenopathy  Neurological: He is alert and oriented to person, place, and time  He has normal reflexes  Skin: Skin is warm and dry  No rash noted  No erythema  Psychiatric: He has a normal mood and affect  Nursing note and vitals reviewed

## 2018-02-26 NOTE — MISCELLANEOUS
Message  Return to work or school:   Toy Moy is under my professional care  He was seen in my office on 12/28/2017   He is able to return to work on  01/15/2018    He is able to work with limitations (desk work only - no lifting, pushing or pulling greater than 1 lb with left arm  No repetitive activites with left arm)      Jack Venegas PA-C under the supervision of Moe Cutler MD       Signatures   Electronically signed by : Jack Venegas, [facility]; Jan 12 2018  2:09PM EST                       (Author)

## 2018-05-11 ENCOUNTER — OFFICE VISIT (OUTPATIENT)
Dept: FAMILY MEDICINE CLINIC | Facility: CLINIC | Age: 33
End: 2018-05-11
Payer: COMMERCIAL

## 2018-05-11 VITALS
WEIGHT: 294.8 LBS | SYSTOLIC BLOOD PRESSURE: 140 MMHG | TEMPERATURE: 97.5 F | BODY MASS INDEX: 37.83 KG/M2 | DIASTOLIC BLOOD PRESSURE: 88 MMHG | HEIGHT: 74 IN | RESPIRATION RATE: 18 BRPM | HEART RATE: 73 BPM

## 2018-05-11 DIAGNOSIS — F51.04 PSYCHOPHYSIOLOGICAL INSOMNIA: Primary | ICD-10-CM

## 2018-05-11 DIAGNOSIS — F32.A DEPRESSION, UNSPECIFIED DEPRESSION TYPE: ICD-10-CM

## 2018-05-11 PROCEDURE — 99214 OFFICE O/P EST MOD 30 MIN: CPT | Performed by: FAMILY MEDICINE

## 2018-05-11 NOTE — LETTER
May 11, 2018     Patient: Tony Meléndez   YOB: 1985   Date of Visit: 5/11/2018       To Whom it May Concern:    Tony Meléndez is under my professional care  He was seen in my office on 5/11/2018  He may return to work on 5/18/18  Pending further evaluation    If you have any questions or concerns, please don't hesitate to call           Sincerely,          Mikael Koyanagi,         CC: No Recipients

## 2018-05-21 ENCOUNTER — APPOINTMENT (EMERGENCY)
Dept: RADIOLOGY | Facility: HOSPITAL | Age: 33
End: 2018-05-21
Payer: COMMERCIAL

## 2018-05-21 ENCOUNTER — HOSPITAL ENCOUNTER (EMERGENCY)
Facility: HOSPITAL | Age: 33
Discharge: HOME | End: 2018-05-21
Attending: EMERGENCY MEDICINE | Admitting: EMERGENCY MEDICINE
Payer: COMMERCIAL

## 2018-05-21 VITALS
HEART RATE: 65 BPM | RESPIRATION RATE: 16 BRPM | WEIGHT: 300 LBS | TEMPERATURE: 97.7 F | BODY MASS INDEX: 38.52 KG/M2 | OXYGEN SATURATION: 99 % | DIASTOLIC BLOOD PRESSURE: 76 MMHG | SYSTOLIC BLOOD PRESSURE: 137 MMHG

## 2018-05-21 DIAGNOSIS — R11.10 VOMITING: ICD-10-CM

## 2018-05-21 DIAGNOSIS — A09 TRAVELER'S DIARRHEA: ICD-10-CM

## 2018-05-21 DIAGNOSIS — S90.411A ABRASION OF RIGHT GREAT TOE, INITIAL ENCOUNTER: Primary | ICD-10-CM

## 2018-05-21 LAB
ALBUMIN SERPL BCP-MCNC: 3.9 G/DL (ref 3.5–5)
ALP SERPL-CCNC: 33 U/L (ref 46–116)
ALT SERPL W P-5'-P-CCNC: 39 U/L (ref 12–78)
ANION GAP SERPL CALCULATED.3IONS-SCNC: 7 MMOL/L (ref 4–13)
AST SERPL W P-5'-P-CCNC: 30 U/L (ref 5–45)
BACTERIA UR QL AUTO: NORMAL /HPF
BASOPHILS # BLD AUTO: 0.02 THOUSANDS/ΜL (ref 0–0.1)
BASOPHILS NFR BLD AUTO: 0 % (ref 0–1)
BILIRUB SERPL-MCNC: 0.38 MG/DL (ref 0.2–1)
BILIRUB UR QL STRIP: NEGATIVE
BUN SERPL-MCNC: 15 MG/DL (ref 5–25)
CALCIUM SERPL-MCNC: 8.9 MG/DL (ref 8.3–10.1)
CHLORIDE SERPL-SCNC: 103 MMOL/L (ref 100–108)
CLARITY UR: CLEAR
CO2 SERPL-SCNC: 29 MMOL/L (ref 21–32)
COLOR UR: YELLOW
CREAT SERPL-MCNC: 1.19 MG/DL (ref 0.6–1.3)
EOSINOPHIL # BLD AUTO: 0.06 THOUSAND/ΜL (ref 0–0.61)
EOSINOPHIL NFR BLD AUTO: 1 % (ref 0–6)
ERYTHROCYTE [DISTWIDTH] IN BLOOD BY AUTOMATED COUNT: 12.8 % (ref 11.6–15.1)
GFR SERPL CREATININE-BSD FRML MDRD: 81 ML/MIN/1.73SQ M
GLUCOSE SERPL-MCNC: 95 MG/DL (ref 65–140)
GLUCOSE UR STRIP-MCNC: NEGATIVE MG/DL
HCT VFR BLD AUTO: 41.2 % (ref 36.5–49.3)
HGB BLD-MCNC: 14.5 G/DL (ref 12–17)
HGB UR QL STRIP.AUTO: ABNORMAL
KETONES UR STRIP-MCNC: NEGATIVE MG/DL
LEUKOCYTE ESTERASE UR QL STRIP: NEGATIVE
LIPASE SERPL-CCNC: 109 U/L (ref 73–393)
LYMPHOCYTES # BLD AUTO: 1.2 THOUSANDS/ΜL (ref 0.6–4.47)
LYMPHOCYTES NFR BLD AUTO: 21 % (ref 14–44)
MCH RBC QN AUTO: 31.2 PG (ref 26.8–34.3)
MCHC RBC AUTO-ENTMCNC: 35.2 G/DL (ref 31.4–37.4)
MCV RBC AUTO: 89 FL (ref 82–98)
MONOCYTES # BLD AUTO: 0.39 THOUSAND/ΜL (ref 0.17–1.22)
MONOCYTES NFR BLD AUTO: 7 % (ref 4–12)
NEUTROPHILS # BLD AUTO: 4 THOUSANDS/ΜL (ref 1.85–7.62)
NEUTS SEG NFR BLD AUTO: 70 % (ref 43–75)
NITRITE UR QL STRIP: NEGATIVE
NON-SQ EPI CELLS URNS QL MICRO: NORMAL /HPF
NRBC BLD AUTO-RTO: 0 /100 WBCS
PH UR STRIP.AUTO: 6 [PH] (ref 4.5–8)
PLATELET # BLD AUTO: 168 THOUSANDS/UL (ref 149–390)
PMV BLD AUTO: 9.7 FL (ref 8.9–12.7)
POTASSIUM SERPL-SCNC: 4.5 MMOL/L (ref 3.5–5.3)
PROT SERPL-MCNC: 7.3 G/DL (ref 6.4–8.2)
PROT UR STRIP-MCNC: NEGATIVE MG/DL
RBC # BLD AUTO: 4.65 MILLION/UL (ref 3.88–5.62)
RBC #/AREA URNS AUTO: NORMAL /HPF
SODIUM SERPL-SCNC: 139 MMOL/L (ref 136–145)
SP GR UR STRIP.AUTO: 1.02 (ref 1–1.03)
UROBILINOGEN UR QL STRIP.AUTO: 0.2 E.U./DL
WBC # BLD AUTO: 5.67 THOUSAND/UL (ref 4.31–10.16)
WBC #/AREA URNS AUTO: NORMAL /HPF

## 2018-05-21 PROCEDURE — 96360 HYDRATION IV INFUSION INIT: CPT

## 2018-05-21 PROCEDURE — 80053 COMPREHEN METABOLIC PANEL: CPT | Performed by: PHYSICIAN ASSISTANT

## 2018-05-21 PROCEDURE — 81001 URINALYSIS AUTO W/SCOPE: CPT

## 2018-05-21 PROCEDURE — 85025 COMPLETE CBC W/AUTO DIFF WBC: CPT | Performed by: PHYSICIAN ASSISTANT

## 2018-05-21 PROCEDURE — 73660 X-RAY EXAM OF TOE(S): CPT

## 2018-05-21 PROCEDURE — 99284 EMERGENCY DEPT VISIT MOD MDM: CPT

## 2018-05-21 PROCEDURE — 81002 URINALYSIS NONAUTO W/O SCOPE: CPT | Performed by: PHYSICIAN ASSISTANT

## 2018-05-21 PROCEDURE — 36415 COLL VENOUS BLD VENIPUNCTURE: CPT | Performed by: PHYSICIAN ASSISTANT

## 2018-05-21 PROCEDURE — 83690 ASSAY OF LIPASE: CPT | Performed by: PHYSICIAN ASSISTANT

## 2018-05-21 RX ORDER — CIPROFLOXACIN 500 MG/1
500 TABLET, FILM COATED ORAL 2 TIMES DAILY
Qty: 14 TABLET | Refills: 0 | Status: SHIPPED | OUTPATIENT
Start: 2018-05-21 | End: 2018-05-28

## 2018-05-21 RX ORDER — BACITRACIN, NEOMYCIN, POLYMYXIN B 400; 3.5; 5 [USP'U]/G; MG/G; [USP'U]/G
OINTMENT TOPICAL 2 TIMES DAILY
Qty: 15 G | Refills: 0 | Status: SHIPPED | OUTPATIENT
Start: 2018-05-21 | End: 2018-08-09 | Stop reason: CLARIF

## 2018-05-21 RX ORDER — ONDANSETRON 4 MG/1
4 TABLET, ORALLY DISINTEGRATING ORAL EVERY 8 HOURS PRN
Qty: 12 TABLET | Refills: 0 | Status: SHIPPED | OUTPATIENT
Start: 2018-05-21 | End: 2018-11-16

## 2018-05-21 RX ADMIN — SODIUM CHLORIDE 1000 ML: 0.9 INJECTION, SOLUTION INTRAVENOUS at 08:15

## 2018-05-21 NOTE — ED PROVIDER NOTES
History  Chief Complaint   Patient presents with    Toe Injury     pt states he cut his right toe with either coral or rock in [facility]  c/o pain   Diarrhea     pt c/o vomiting and diarrhea since Saturday pt states he came back from [facility] on Friday  pt states he feels dehydrated  c/o nausea,"cotton ball mouth" and chills  last vomiting episode last night  last diarrhea episode this am       32y  o male with PMH of ADHD and depression presents to the ER for wound to his right great toe for 5 days  Patient states he was in [facility] swimming in a river when he either cut his toe on a rock or coral  He washed the area with alcohol and iodine  He states he has tingling at the site but denies pain  He only experiences pain if the area is touched  Patient also complains of non-bloody vomiting and diarrhea for 3 days  Patient has been taking Pepto Bismul without relief  He denies pain  Symptoms are constant  He denies fever, chills, chest pain, dyspnea, urinary symptoms, weakness or paresthesias  History provided by:  Patient   used: No        Prior to Admission Medications   Prescriptions Last Dose Informant Patient Reported? Taking? cloNIDine (CATAPRES) 0 1 mg tablet   Yes Yes   Sig: take 1/2 tablet by mouth AT 5:30PM AND 1/2 TABLET AT 9:30PM FOR 4   (REFER TO PRESCRIPTION NOTES)  gabapentin (NEURONTIN) 300 mg capsule   Yes Yes   Sig: take 1 capsule by mouth AT 5:30PM AND 1 CAPSULE AT 9:30PM FOR 8 D     (REFER TO PRESCRIPTION NOTES)     lamoTRIgine (LaMICtal) 100 mg tablet   Yes Yes   Sig: Take 100 mg by mouth 2 (two) times a day      Facility-Administered Medications: None       Past Medical History:   Diagnosis Date    ADHD     Depression     Psychiatric disorder        Past Surgical History:   Procedure Laterality Date    NC REINSERT BI/TRICEPS TENDON,DISTAL Left 12/26/2017    Procedure: DISTAL BICEPS REPAIR;  Surgeon: Windy Aquino MD;  [facility]: [facility];  Service: Orthopedics       Family History   Problem Relation Age of Onset    Diabetes Maternal Grandmother     Heart disease Maternal Grandmother      I have reviewed and agree with the history as documented  Social History   Substance Use Topics    Smoking status: Never Smoker    Smokeless tobacco: Never Used    Alcohol use Yes      Comment: every day        Review of Systems   Constitutional: Negative for chills and fever  Eyes: Negative for redness  Respiratory: Negative for shortness of breath  Cardiovascular: Negative for chest pain  Gastrointestinal: Positive for diarrhea, nausea and vomiting  Negative for abdominal pain  Genitourinary: Negative for dysuria, frequency, hematuria and urgency  Musculoskeletal: Negative for neck stiffness  Skin: Positive for wound (right great toe)  Negative for rash  Allergic/Immunologic: Negative for food allergies  Neurological: Negative for weakness and numbness  Physical Exam  Physical Exam   Constitutional:  Non-toxic appearance  No distress  HENT:   Head: Normocephalic and atraumatic  Right Ear: Tympanic membrane, external ear and ear canal normal  No drainage, swelling or tenderness  No foreign bodies  Tympanic membrane is not erythematous  No hemotympanum  Left Ear: Tympanic membrane, external ear and ear canal normal  No drainage, swelling or tenderness  No foreign bodies  Tympanic membrane is not erythematous  No hemotympanum  Nose: Nose normal    Mouth/Throat: Uvula is midline, oropharynx is clear and moist and mucous membranes are normal  No uvula swelling  No posterior oropharyngeal edema, posterior oropharyngeal erythema or tonsillar abscesses  No tonsillar exudate  Eyes: Conjunctivae and lids are normal    Neck: Normal range of motion and phonation normal  Neck supple  No tracheal deviation present  Cardiovascular: Normal rate, regular rhythm, S1 normal, S2 normal and normal heart sounds    Exam reveals no gallop and no friction rub     No murmur heard  Pulmonary/Chest: Effort normal and breath sounds normal  No respiratory distress  He has no decreased breath sounds  He has no wheezes  He has no rhonchi  He has no rales  He exhibits no tenderness  Musculoskeletal:        Right foot: There is laceration  There is normal range of motion, no tenderness, no bony tenderness, no swelling, normal capillary refill, no crepitus and no deformity  Feet:    Neurological: He is alert  GCS eye subscore is 4  GCS verbal subscore is 5  GCS motor subscore is 6  Skin: Skin is warm and dry  No rash noted  Psychiatric: He has a normal mood and affect  Nursing note and vitals reviewed            Vital Signs  ED Triage Vitals   Temperature Pulse Respirations Blood Pressure SpO2   05/21/18 0740 05/21/18 0740 05/21/18 0740 05/21/18 0740 05/21/18 0740   97 7 °F (36 5 °C) 69 18 132/76 99 %      Temp Source Heart Rate Source Patient Position - Orthostatic VS BP Location FiO2 (%)   05/21/18 0740 05/21/18 0740 05/21/18 0740 05/21/18 0740 --   Oral Monitor Sitting Right arm       Pain Score       05/21/18 0918       No Pain           Vitals:    05/21/18 0740 05/21/18 0918   BP: 132/76 137/76   Pulse: 69 65   Patient Position - Orthostatic VS: Sitting Sitting       Visual Acuity      ED Medications  Medications   sodium chloride 0 9 % bolus 1,000 mL (0 mL Intravenous Stopped 5/21/18 0926)       Diagnostic Studies  Results Reviewed     Procedure Component Value Units Date/Time    Comprehensive metabolic panel [[id number]]  (Abnormal) Collected:  05/21/18 0816    Lab Status:  Final result Specimen:  Blood from Arm, Right Updated:  05/21/18 0838     Sodium 139 mmol/L      Potassium 4 5 mmol/L      Chloride 103 mmol/L      CO2 29 mmol/L      Anion Gap 7 mmol/L      BUN 15 mg/dL      Creatinine 1 19 mg/dL      Glucose 95 mg/dL      Calcium 8 9 mg/dL      AST 30 U/L      ALT 39 U/L      Alkaline Phosphatase 33 (L) U/L      Total Protein 7 3 g/dL      Albumin 3 9 g/dL      Total Bilirubin 0 38 mg/dL      eGFR 81 ml/min/1 73sq m     Narrative:         National Kidney Disease Education Program recommendations are as follows:  GFR calculation is accurate only with a steady state creatinine  Chronic Kidney disease less than 60 ml/min/1 73 sq  meters  Kidney failure less than 15 ml/min/1 73 sq  meters      Lipase [[id number]]  (Normal) Collected:  05/21/18 0816    Lab Status:  Final result Specimen:  Blood from Arm, Right Updated:  05/21/18 0838     Lipase 109 u/L     Urine Microscopic [[id number]]  (Normal) Collected:  05/21/18 0815    Lab Status:  Final result Specimen:  Urine from Urine, Clean Catch Updated:  05/21/18 0831     RBC, UA None Seen /hpf      WBC, UA None Seen /hpf      Epithelial Cells Occasional /hpf      Bacteria, UA Occasional /hpf     CBC and differential [[id number]] Collected:  05/21/18 0816    Lab Status:  Final result Specimen:  Blood from Arm, Right Updated:  05/21/18 0826     WBC 5 67 Thousand/uL      RBC 4 65 Million/uL      Hemoglobin 14 5 g/dL      Hematocrit 41 2 %      MCV 89 fL      MCH 31 2 pg      MCHC 35 2 g/dL      RDW 12 8 %      MPV 9 7 fL      Platelets 524 Thousands/uL      nRBC 0 /100 WBCs      Neutrophils Relative 70 %      Lymphocytes Relative 21 %      Monocytes Relative 7 %      Eosinophils Relative 1 %      Basophils Relative 0 %      Neutrophils Absolute 4 00 Thousands/µL      Lymphocytes Absolute 1 20 Thousands/µL      Monocytes Absolute 0 39 Thousand/µL      Eosinophils Absolute 0 06 Thousand/µL      Basophils Absolute 0 02 Thousands/µL     POCT urinalysis dipstick [[id number]]  (Abnormal) Resulted:  05/21/18 0814    Lab Status:  Final result Specimen:  Urine Updated:  05/21/18 0814    ED Urine Macroscopic [[id number]]  (Abnormal) Collected:  05/21/18 0815    Lab Status:  Final result Specimen:  Urine Updated:  05/21/18 0813     Color, UA Yellow     Clarity, UA Clear     pH, UA 6 0     Leukocytes, UA Negative     Nitrite, UA Negative Protein, UA Negative mg/dl      Glucose, UA Negative mg/dl      Ketones, UA Negative mg/dl      Urobilinogen, UA 0 2 E U /dl      Bilirubin, UA Negative     Blood, UA Trace (A)     Specific [place], UA 1 020    Narrative:       CLINITEK RESULT                 XR toe great min 2 view RIGHT   ED Interpretation by Joe Soliz PA-C (05/21 7695)   No acute fracture seen by me at this time  Procedures  Procedures       Phone Contacts  ED Phone Contact    ED Course  ED Course as of May 21 0943   Mon May 21, 2018   7187 Informed patient of lab and imaging findings  Patient states he drank tap water while in [facility] but did have ice cubes from the ice machine  Will cover with antibiotics  Gave patient RTER precautions  MDM  Number of Diagnoses or Management Options  Abrasion of right great toe, initial encounter: new and requires workup  Traveler's diarrhea: new and requires workup  Vomiting: new and requires workup  Diagnosis management comments: DDX consists of but not limited to: travelers diarrhea, parasitic infection, cellulitis of toe, toe fracture    Will xray toe  Will check CBC, CMP, lipase and urine  Will give fluids  Patient not complaining of nausea currently  Will hold off on Zofran  Patient not complaining of abdominal pain and is non-tender to palpation  Will hold off on abdominal imaging  At discharge, I instructed the patient to:  -follow up with pcp  -take Ciprofloxacin as prescribed  -take Zofran as prescribed  -apply Neosporin to abrasion as prescribed  -have stool cultures completed  -keep abrasion clean  -watch for signs of infection: redness, swelling or discharge  -rest and drink plenty of fluids  -return to the ER if symptoms worsened or new symptoms arose  Patient agreed to this plan and was stable at time of discharge         Amount and/or Complexity of Data Reviewed  Clinical lab tests: ordered and reviewed  Tests in the radiology section of CPT®: ordered and reviewed  Independent visualization of images, tracings, or specimens: yes    Patient Progress  Patient progress: stable    CritCare Time    Disposition  Final diagnoses:   Abrasion of right great toe, initial encounter   Traveler's diarrhea   Vomiting     Time reflects when diagnosis was documented in both MDM as applicable and the Disposition within this note     Time User Action Codes Description Comment    5/21/2018  8:48 AM Manette Theron A Add [R09 046R] Abrasion of right great toe, initial encounter     5/21/2018  8:48 AM Manette [place] A Add [P61] Traveler's diarrhea     5/21/2018  8:48 AM Manette Theron A Add [R11 10] Vomiting       ED Disposition     ED Disposition Condition Comment    Discharge  [place] discharge to home/self care  Condition at discharge: Stable        Follow-up Information     Follow up With Specialties Details Why Contact Info    Wallace Dobbins DO Family Medicine Schedule an appointment as soon as possible for a visit in 1 day  [phone]  [phone]            Discharge Medication List as of 5/21/2018  9:01 AM      START taking these medications    Details   ciprofloxacin (CIPRO) 500 mg tablet Take 1 tablet (500 mg total) by mouth 2 (two) times a day for 7 days, Starting Mon 5/21/2018, Until Mon 5/28/2018, Print      neomycin-bacitracin-polymyxin b (NEOSPORIN) ointment Apply topically 2 (two) times a day, Starting Mon 5/21/2018, Print      ondansetron (ZOFRAN-ODT) 4 mg disintegrating tablet Take 1 tablet (4 mg total) by mouth every 8 (eight) hours as needed for nausea or vomiting, Starting Mon 5/21/2018, Print         CONTINUE these medications which have NOT CHANGED    Details   cloNIDine (CATAPRES) 0 1 mg tablet take 1/2 tablet by mouth AT 5:30PM AND 1/2 TABLET AT 9:30PM FOR 4   (REFER TO PRESCRIPTION NOTES)  , Historical Med      gabapentin (NEURONTIN) 300 mg capsule take 1 capsule by mouth AT 5:30PM AND 1 CAPSULE AT 9:30PM FOR 8 D     (REFER TO PRESCRIPTION NOTES)  , Historical Med      lamoTRIgine (LaMICtal) 100 mg tablet Take 100 mg by mouth 2 (two) times a day, Historical Med             Outpatient Discharge Orders  Giardia, EIA, Ova/Parasite   Standing Status: Future  Standing Exp  Date: 05/21/19     Clostridium difficile toxin by PCR   Standing Status: Future  Standing Exp  Date: 05/21/19     Stool Enteric Bacterial Panel by PCR   Standing Status: Future  Standing Exp   Date: 05/21/19         ED Provider  Electronically Signed by           Maegan Abrams PA-C  05/21/18 0990

## 2018-05-21 NOTE — DISCHARGE INSTRUCTIONS
Abrasion   WHAT YOU NEED TO KNOW:   An abrasion is a scrape on your skin  It happens when your skin rubs against a rough surface  Some examples of an abrasion include rug burn, a skinned elbow, or road rash  Abrasions can be many shapes and sizes  The wound may hurt, bleed, bruise, or swell  DISCHARGE INSTRUCTIONS:   Return to the emergency department if:   · The bleeding does not stop after 10 minutes of firm pressure  · You cannot rinse one or more foreign objects out of your wound  · You have red streaks on your skin coming from your wound  Contact your healthcare provider if:   · You have a fever or chills  · Your abrasion is red, warm, swollen, or draining pus  · You have questions or concerns about your condition or care  Care for your abrasion:   · Wash your hands and dry them with a clean towel  · Press a clean cloth against your wound to stop any bleeding  · Rinse your wound with a lot of clean water  Do not use harsh soap, alcohol, or iodine solutions  · Use a clean, wet cloth to remove any objects, such as small pieces of rocks or dirt  · Rub antibiotic ointment on your wound  This may help prevent infection and help your wound heal     · Cover the wound with a non-stick bandage  Change the bandage daily, and if gets wet or dirty  Follow up with your healthcare provider as directed:  Write down your questions so you remember to ask them during your visits  © 2017 [address] Information is for End User's use only and may not be sold, redistributed or otherwise used for commercial purposes  All illustrations and images included in CareNotes® are the copyrighted property of A D [employer] , [employer]  or Leonard Samuel  The above information is an  only  It is not intended as medical advice for individual conditions or treatments   Talk to your doctor, nurse or pharmacist before following any medical regimen to see if it is safe and effective for you     Acute Nausea and Vomiting   WHAT YOU NEED TO KNOW:   Acute nausea and vomiting start suddenly, worsen quickly, and last a short time  DISCHARGE INSTRUCTIONS:   Return to the emergency department if:   · You see blood in your vomit or your bowel movements  · You have sudden, severe pain in your chest and upper abdomen after hard vomiting or retching  · You have swelling in your neck and chest      · You are dizzy, cold, and thirsty and your eyes and mouth are dry  · You are urinating very little or not at all  · You have muscle weakness, leg cramps, and trouble breathing  · Your heart is beating much faster than normal      · You continue to vomit for more than 48 hours  Contact your healthcare provider if:   · You have frequent dry heaves (vomiting but nothing comes out)  · Your nausea and vomiting does not get better or go away after you use medicine  · You have questions or concerns about your condition or treatment  Medicines: You may need any of the following:  · Medicines  may be given to calm your stomach and stop your vomiting  You may also need medicines to help you feel more relaxed or to stop nausea and vomiting caused by motion sickness  · Gastrointestinal stimulants  are used to help empty your stomach and bowels  This may help decrease nausea and vomiting  · Take your medicine as directed  Contact your healthcare provider if you think your medicine is not helping or if you have side effects  Tell him or her if you are allergic to any medicine  Keep a list of the medicines, vitamins, and herbs you take  Include the amounts, and when and why you take them  Bring the list or the pill bottles to follow-up visits  Carry your medicine list with you in case of an emergency  Prevent or manage acute nausea and vomiting:   · Do not drink alcohol  Alcohol may upset or irritate your stomach  Too much alcohol can also cause acute nausea and vomiting  · Control stress  Headaches due to stress may cause nausea and vomiting  Find ways to relax and manage your stress  Get more rest and sleep  · Drink more liquids as directed  Vomiting can lead to dehydration  It is important to drink more liquids to help replace lost body fluids  Ask your healthcare provider how much liquid to drink each day and which liquids are best for you  Your provider may recommend that you drink an oral rehydration solution (ORS)  ORS contains water, salts, and sugar that are needed to replace the lost body fluids  Ask what kind of ORS to use, how much to drink, and where to get it  · Eat smaller meals, more often  Eat small amounts of food every 2 to 3 hours, even if you are not hungry  Food in your stomach may decrease your nausea  · Talk to your healthcare provider before you take over-the-counter (OTC) medicines  These medicines can cause serious problems if you use certain other medicines, or you have a medical condition  You may have problems if you use too much or use them for longer than the label says  Follow directions on the label carefully  Follow up with your healthcare provider as directed:  Write down your questions so you remember to ask them during your follow-up visits  © 2017 [address] Information is for End User's use only and may not be sold, redistributed or otherwise used for commercial purposes  All illustrations and images included in CareNotes® are the copyrighted property of A D A [employer]  or Leonard Samuel  The above information is an  only  It is not intended as medical advice for individual conditions or treatments  Talk to your doctor, nurse or pharmacist before following any medical regimen to see if it is safe and effective for you  Traveler's Diarrhea   WHAT YOU NEED TO KNOW:   Traveler's diarrhea occurs during travel or within 10 days after you travel   You can get traveler's diarrhea when you eat or drink contaminated food or water  The food or water may contain bacteria, a virus, or a parasite  Water from a faucet, ice, or drinks that are not sealed can be contaminated  Foods that are prepared with tap water or not cooked properly can also be contaminated  DISCHARGE INSTRUCTIONS:   Return to the emergency department if:   · You urinate less than usual or stop urinating  · You see blood in your bowel movement  · You have severe abdominal pain  · You feel like you are going to faint  · You are too weak to stand up  · You are dizzy or lightheaded  Contact your healthcare provider if:   · Your symptoms do not get better with treatment  · Your diarrhea lasts for more than 7 days  · You have questions or concerns about your condition or care  Medicines:   · Medicines  can help decrease diarrhea or nausea, or treat an infection caused by bacteria or a parasite  · Take your medicine as directed  Contact your healthcare provider if you think your medicine is not helping or if you have side effects  Tell him of her if you are allergic to any medicine  Keep a list of the medicines, vitamins, and herbs you take  Include the amounts, and when and why you take them  Bring the list or the pill bottles to follow-up visits  Carry your medicine list with you in case of an emergency  Manage your symptoms:   · Drink liquids as directed  Liquids will help prevent dehydration caused by diarrhea  Ask your healthcare provider how much liquid to drink each day and which liquids are best for you  You may need to drink an oral rehydration solution (ORS)  An ORS has the right amounts of water, salts, and sugar you need to replace body fluids  You can buy an ORS at most grocery stores and pharmacies  · Eat foods that are easy to digest   Examples include rice, lentils, cereal, bananas, potatoes, and bread  It also includes some fruits (bananas, melon), well-cooked vegetables, and lean meats   Avoid caffeine, alcohol, foods high in fiber, dairy, and red meat until your diarrhea is gone  Prevent traveler's diarrhea:   · Ask if you should take certain medicines or get vaccines before you travel  Your healthcare provider may prescribe antibiotics to prevent traveler's diarrhea  Vaccines can help protect you against bacteria or viruses that cause traveler's diarrhea  · Drink bottled, canned, or boiled liquids only  Do not put ice in your drinks  Boil water for at least 4 minutes, or use purifying tablets to treat the water  Use bottled or treated water to brush your teeth  · Do not eat raw food or dairy when you travel  Examples include fruits, raw vegetables in salads, oysters, clams, or undercooked meat  Do not have milk, ice cream, or other dairy products  Eat foods that are served hot or steaming, breads, peeled fruits and vegetables, and grilled foods  · Wash your hands often  Use bottled water and soap  Wash your hands before you eat or prepare food  Also wash your hands after you use the bathroom  Follow up with your healthcare provider as directed:  Write down your questions so you remember to ask them during your visits  © 2017 [address] [place] [address] Information is for End User's use only and may not be sold, redistributed or otherwise used for commercial purposes  All illustrations and images included in CareNotes® are the copyrighted property of A D A [employer] , [employer]  or Leonard Samuel  The above information is an  only  It is not intended as medical advice for individual conditions or treatments  Talk to your doctor, nurse or pharmacist before following any medical regimen to see if it is safe and effective for you  DISCHARGE INSTRUCTIONS:    FOLLOW UP WITH YOUR PRIMARY CARE PROVIDER OR THE [address] [place] [address]  MAKE AN APPOINTMENT TO BE SEEN  TAKE CIPROFLOXACIN AS PRESCRIBED   IF RASH, SHORTNESS OF BREATH OR TROUBLE SWALLOWING, STOP TAKING THE MEDICATION AND BE SEEN  TAKE ZOFRAN AS PRESCRIBED FOR NAUSEA AND VOMITING  IF RASH, SHORTNESS OF BREATH OR TROUBLE SWALLOWING, STOP TAKING THE MEDICATION AND BE SEEN  APPLY NEOSPORIN TO RIGHT GREAT TOE AS PRESCRIBED  IF RASH, SHORTNESS OF BREATH OR TROUBLE SWALLOWING, STOP TAKING THE MEDICATION AND BE SEEN  REST AND DRINK PLENTY OF FLUIDS  KEEP THE AREA CLEAN  WATCH FOR SIGNS OF INFECTION: REDNESS, SWELLING OR DISCHARGE     IF SYMPTOMS WORSEN OR NEW SYMPTOMS ARISE, RETURN TO THE ER TO BE SEEN

## 2018-05-21 NOTE — ED NOTES
Stool specimen containers given to patient instructed on use   Verbalize understanding      Jenni Pineda RN  05/21/18 5239

## 2018-06-01 NOTE — ED NOTES
Called to inform patient of stool culture results  Patient tested positive for E coli  Patient states his symptoms have resolved        Hiwot Estrada PA-C  06/01/18 1707

## 2018-06-14 DIAGNOSIS — F31.9 BIPOLAR 1 DISORDER (HCC): Primary | ICD-10-CM

## 2018-06-14 RX ORDER — LAMOTRIGINE 100 MG/1
100 TABLET ORAL 2 TIMES DAILY
Qty: 180 TABLET | Refills: 3 | Status: SHIPPED | OUTPATIENT
Start: 2018-06-14 | End: 2019-03-12

## 2018-08-09 ENCOUNTER — OFFICE VISIT (OUTPATIENT)
Dept: FAMILY MEDICINE CLINIC | Facility: CLINIC | Age: 33
End: 2018-08-09
Payer: COMMERCIAL

## 2018-08-09 VITALS
SYSTOLIC BLOOD PRESSURE: 110 MMHG | HEART RATE: 60 BPM | BODY MASS INDEX: 38.17 KG/M2 | HEIGHT: 73 IN | DIASTOLIC BLOOD PRESSURE: 82 MMHG | WEIGHT: 288 LBS | RESPIRATION RATE: 16 BRPM | TEMPERATURE: 98.3 F

## 2018-08-09 DIAGNOSIS — F41.9 ANXIETY: Primary | ICD-10-CM

## 2018-08-09 PROBLEM — S46.212A TRAUMATIC RUPTURE OF LEFT DISTAL BICEPS TENDON: Status: ACTIVE | Noted: 2017-12-09

## 2018-08-09 PROCEDURE — 99213 OFFICE O/P EST LOW 20 MIN: CPT | Performed by: FAMILY MEDICINE

## 2018-08-09 RX ORDER — DEXTROAMPHETAMINE SACCHARATE, AMPHETAMINE ASPARTATE MONOHYDRATE, DEXTROAMPHETAMINE SULFATE AND AMPHETAMINE SULFATE 7.5; 7.5; 7.5; 7.5 MG/1; MG/1; MG/1; MG/1
10 CAPSULE, EXTENDED RELEASE ORAL
COMMUNITY
Start: 2018-08-02

## 2018-08-09 NOTE — PROGRESS NOTES
Assessment/Plan:    Anxiety  Forms filled out, psych has started on add meds, pt already on fish oil       There are no diagnoses linked to this encounter  Subjective:      Patient ID: Kayla Martínez is a 33 y o  male  Anxiety   Presents for follow-up visit  Symptoms include decreased concentration, depressed mood, irritability and nervous/anxious behavior  Patient reports no chest pain, excessive worry or shortness of breath  Symptoms occur most days  The severity of symptoms is moderate  The quality of sleep is fair  Nighttime awakenings: several      Compliance with medications is %  The following portions of the patient's history were reviewed and updated as appropriate: allergies, current medications, past family history, past medical history, past social history, past surgical history and problem list       Review of Systems   Constitutional: Positive for fatigue and irritability  Negative for activity change and appetite change  Respiratory: Negative for shortness of breath  Cardiovascular: Negative for chest pain  Neurological: Negative for headaches  Psychiatric/Behavioral: Positive for decreased concentration  The patient is nervous/anxious  Objective:      /82 (BP Location: Left arm, Patient Position: Sitting, Cuff Size: Adult)   Pulse 60   Temp 98 3 °F (36 8 °C) (Temporal)   Resp 16   Ht 6' 1 05" (1 855 m)   Wt 131 kg (288 lb)   BMI 37 95 kg/m²          Physical Exam   Constitutional: He appears well-developed and well-nourished  Neck: No thyromegaly present  Cardiovascular: Normal rate, regular rhythm and normal heart sounds  Pulmonary/Chest: Breath sounds normal    Musculoskeletal: He exhibits no edema  Lymphadenopathy:     He has no cervical adenopathy  Psychiatric: He has a normal mood and affect  Vitals reviewed

## 2018-08-13 ENCOUNTER — TELEPHONE (OUTPATIENT)
Dept: OBGYN CLINIC | Facility: HOSPITAL | Age: 33
End: 2018-08-13

## 2018-08-13 NOTE — TELEPHONE ENCOUNTER
Caller: patient  Call back number: [phone]  Patient's doctor: Dr Gunnar Hargrove    Patient had surgery for a bicep tendon on 12/26/17  Patient states that he returned to work in February and slowly worked his way up to full duty  He thinks it was 3/12 that he was back to full duty  He states that his employer just asked him for a note to be full duty  Patient will call back with a fax

## 2018-08-16 NOTE — TELEPHONE ENCOUNTER
Called patient at # on file  No answer  Left message  Note done  I can fax if he calls back with fax#     OR    He can  at his convenience Bladder non-tender and non-distended. Urine clear yellow.

## 2018-08-27 ENCOUNTER — TELEPHONE (OUTPATIENT)
Dept: FAMILY MEDICINE CLINIC | Facility: CLINIC | Age: 33
End: 2018-08-27

## 2018-08-27 ENCOUNTER — OFFICE VISIT (OUTPATIENT)
Dept: FAMILY MEDICINE CLINIC | Facility: CLINIC | Age: 33
End: 2018-08-27
Payer: COMMERCIAL

## 2018-08-27 VITALS
TEMPERATURE: 97.2 F | RESPIRATION RATE: 16 BRPM | HEART RATE: 88 BPM | DIASTOLIC BLOOD PRESSURE: 104 MMHG | SYSTOLIC BLOOD PRESSURE: 128 MMHG | WEIGHT: 281.13 LBS | BODY MASS INDEX: 37.26 KG/M2 | HEIGHT: 73 IN

## 2018-08-27 DIAGNOSIS — F31.9 BIPOLAR AFFECTIVE DISORDER, REMISSION STATUS UNSPECIFIED (HCC): ICD-10-CM

## 2018-08-27 DIAGNOSIS — T14.8XXA SUPERFICIAL LACERATION: ICD-10-CM

## 2018-08-27 DIAGNOSIS — F32.A DEPRESSION, UNSPECIFIED DEPRESSION TYPE: Primary | ICD-10-CM

## 2018-08-27 DIAGNOSIS — F90.1 ATTENTION DEFICIT HYPERACTIVITY DISORDER (ADHD), PREDOMINANTLY HYPERACTIVE TYPE: ICD-10-CM

## 2018-08-27 DIAGNOSIS — F41.9 ANXIETY: ICD-10-CM

## 2018-08-27 DIAGNOSIS — G47.00 INSOMNIA, UNSPECIFIED TYPE: ICD-10-CM

## 2018-08-27 DIAGNOSIS — B00.1 HERPES LABIALIS: ICD-10-CM

## 2018-08-27 PROCEDURE — 99214 OFFICE O/P EST MOD 30 MIN: CPT | Performed by: FAMILY MEDICINE

## 2018-08-27 PROCEDURE — 3008F BODY MASS INDEX DOCD: CPT | Performed by: FAMILY MEDICINE

## 2018-08-27 RX ORDER — VALACYCLOVIR HYDROCHLORIDE 1 G/1
TABLET, FILM COATED ORAL
Qty: 4 TABLET | Refills: 0 | Status: SHIPPED | OUTPATIENT
Start: 2018-08-27 | End: 2018-11-16

## 2018-08-27 NOTE — PROGRESS NOTES
Assessment/Plan:    Insomnia  Trial belsomra       Diagnoses and all orders for this visit:    Depression, unspecified depression type    Anxiety    Bipolar affective disorder, remission status unspecified (HCC)    Attention deficit hyperactivity disorder (ADHD), predominantly hyperactive type    Herpes labialis    Superficial laceration  Comments:  utd tetanus shot    Insomnia, unspecified type  -     Suvorexant (BELSOMRA) 15 MG TABS; Take 1 tablet (15 mg total) by mouth daily at bedtime          Subjective:      Patient ID: Ricco Brink is a 33 y o  male  F/u insomnia-still with issues, 8 melatonin didn't work        The following portions of the patient's history were reviewed and updated as appropriate: allergies, current medications, past family history, past medical history, past social history, past surgical history and problem list     Review of Systems   Constitutional: Negative for activity change, appetite change and unexpected weight change  Respiratory: Negative for shortness of breath  Cardiovascular: Negative for chest pain  Skin: Positive for rash and wound  Neurological: Negative for headaches  Psychiatric/Behavioral: Positive for dysphoric mood and sleep disturbance  The patient is nervous/anxious  Objective:      BP (!) 128/104 (BP Location: Left arm, Patient Position: Sitting, Cuff Size: Adult)   Pulse 88   Temp (!) 97 2 °F (36 2 °C) (Temporal)   Resp 16   Ht 6' 1 07" (1 856 m)   Wt 128 kg (281 lb 2 oz)   BMI 37 01 kg/m²          Physical Exam   Constitutional: He appears well-developed and well-nourished  Cardiovascular: Normal rate, regular rhythm and normal heart sounds  Pulmonary/Chest: Breath sounds normal    Skin:   Superficial laceration r 2nd finger, cold sore l upper lip   Psychiatric: He has a normal mood and affect  Vitals reviewed

## 2018-10-09 ENCOUNTER — TELEPHONE (OUTPATIENT)
Dept: FAMILY MEDICINE CLINIC | Facility: CLINIC | Age: 33
End: 2018-10-09

## 2018-10-09 NOTE — TELEPHONE ENCOUNTER
FYI  As per pt he will fax in [facility] paperwork  Pt employment is requesting more detailed explanation as to why pt needs to be on FMLA  Pls forward paperwork once received to Dr Shani Suarez for completion

## 2018-10-24 ENCOUNTER — TELEPHONE (OUTPATIENT)
Dept: FAMILY MEDICINE CLINIC | Facility: CLINIC | Age: 33
End: 2018-10-24

## 2018-11-16 ENCOUNTER — OFFICE VISIT (OUTPATIENT)
Dept: FAMILY MEDICINE CLINIC | Facility: CLINIC | Age: 33
End: 2018-11-16
Payer: COMMERCIAL

## 2018-11-16 VITALS
DIASTOLIC BLOOD PRESSURE: 100 MMHG | HEART RATE: 84 BPM | WEIGHT: 263 LBS | RESPIRATION RATE: 18 BRPM | SYSTOLIC BLOOD PRESSURE: 140 MMHG | TEMPERATURE: 97.5 F | HEIGHT: 73 IN | BODY MASS INDEX: 34.85 KG/M2

## 2018-11-16 DIAGNOSIS — F41.9 ANXIETY: ICD-10-CM

## 2018-11-16 DIAGNOSIS — M79.602 PAIN OF LEFT UPPER EXTREMITY: Primary | ICD-10-CM

## 2018-11-16 PROCEDURE — 99213 OFFICE O/P EST LOW 20 MIN: CPT | Performed by: FAMILY MEDICINE

## 2018-11-16 PROCEDURE — 3008F BODY MASS INDEX DOCD: CPT | Performed by: FAMILY MEDICINE

## 2018-11-16 PROCEDURE — 1036F TOBACCO NON-USER: CPT | Performed by: FAMILY MEDICINE

## 2018-11-16 RX ORDER — MELOXICAM 7.5 MG/1
7.5 TABLET ORAL 2 TIMES DAILY PRN
Qty: 60 TABLET | Refills: 2 | Status: SHIPPED | OUTPATIENT
Start: 2018-11-16 | End: 2019-06-07 | Stop reason: CLARIF

## 2018-11-16 NOTE — PROGRESS NOTES
Assessment/Plan:    Pain of left upper extremity  Now triceps hurts vs possibility of pinched  nerve    Anxiety  Having more anxiety these last few days       Diagnoses and all orders for this visit:    Rupture of distal biceps tendon, left, subsequent encounter    Pain of left upper extremity    Anxiety          Subjective:      Patient ID: Leonidas Rojo is a 33 y o  male  Arm Pain    The incident occurred more than 1 week ago  The injury mechanism is unknown  The pain is present in the left shoulder and upper left arm  The quality of the pain is described as aching  The pain does not radiate  The pain is at a severity of 5/10  The pain is moderate  The pain has been fluctuating since the incident  Pertinent negatives include no chest pain or numbness  The symptoms are aggravated by movement and lifting  He has tried nothing for the symptoms  The treatment provided mild relief  The following portions of the patient's history were reviewed and updated as appropriate: allergies, current medications, past family history, past medical history, past social history, past surgical history and problem list       Review of Systems   Constitutional: Negative for activity change, appetite change and unexpected weight change  Respiratory: Negative for shortness of breath  Cardiovascular: Negative for chest pain  Neurological: Negative for numbness  Psychiatric/Behavioral: The patient is nervous/anxious  Objective:      /100 (BP Location: Left arm, Patient Position: Sitting, Cuff Size: Adult)   Pulse 84   Temp 97 5 °F (36 4 °C) (Temporal)   Resp 18   Ht 6' 1 07" (1 856 m)   Wt 119 kg (263 lb)   BMI 34 63 kg/m²          Physical Exam   Constitutional: He appears well-developed and well-nourished  Neck: No thyromegaly present  Cardiovascular: Normal rate, regular rhythm and normal heart sounds  Pulmonary/Chest: Breath sounds normal    Musculoskeletal: He exhibits tenderness   He exhibits no edema  Left upper arm: He exhibits tenderness and swelling  Lymphadenopathy:     He has no cervical adenopathy  Vitals reviewed

## 2018-11-16 NOTE — LETTER
November 16, 2018     Patient: Celine Qiu   YOB: 1985   Date of Visit: 11/16/2018       To Whom it May Concern:    Celine Qiu is under my professional care  He was seen in my office on 11/16/2018  He may return to work on 11/19/18  He has anxiety that precludes him from working these few days    If you have any questions or concerns, please don't hesitate to call           Sincerely,          Edmund Amaral MD        CC: No Recipients

## 2018-11-16 NOTE — LETTER
November 16, 2018     Patient: Kenneth Green   YOB: 1985   Date of Visit: 11/16/2018       To Whom it May Concern:    Kenneth Green is under my professional care  He was seen in my office on 11/16/2018  He may return to work on 11/19/18  If you have any questions or concerns, please don't hesitate to call           Sincerely,          Julia Gonzalez MD        CC: No Recipients

## 2018-11-20 ENCOUNTER — TELEPHONE (OUTPATIENT)
Dept: FAMILY MEDICINE CLINIC | Facility: CLINIC | Age: 33
End: 2018-11-20

## 2018-11-20 NOTE — TELEPHONE ENCOUNTER
Patient called in and stated last time that he seen you a script for MRI was supposed to be generated but I don't see anything inside the system please advise

## 2018-11-21 DIAGNOSIS — M77.8 TRICEPS TENDONITIS: Primary | ICD-10-CM

## 2018-11-23 ENCOUNTER — TELEPHONE (OUTPATIENT)
Dept: FAMILY MEDICINE CLINIC | Facility: CLINIC | Age: 33
End: 2018-11-23

## 2018-11-27 ENCOUNTER — TELEPHONE (OUTPATIENT)
Dept: FAMILY MEDICINE CLINIC | Facility: CLINIC | Age: 33
End: 2018-11-27

## 2019-01-30 ENCOUNTER — TELEPHONE (OUTPATIENT)
Dept: FAMILY MEDICINE CLINIC | Facility: CLINIC | Age: 34
End: 2019-01-30

## 2019-01-30 DIAGNOSIS — S46.319D TRICEPS STRAIN, UNSPECIFIED LATERALITY, SUBSEQUENT ENCOUNTER: Primary | ICD-10-CM

## 2019-01-30 NOTE — TELEPHONE ENCOUNTER
Pt would like a referral to Ortho, pt states any [facility] [place], operated by [facility] provider will be ok

## 2019-01-30 NOTE — TELEPHONE ENCOUNTER
Pt called requesting an MRI of left tricep muscle pt suspect a tear due to sxs  Pt also wanted to know if an Xray is needed as well  The MRI shoulder left wo contrast no longer needed  Please advise pt when MRI of Left Tricep Muscle is generated

## 2019-03-04 ENCOUNTER — OFFICE VISIT (OUTPATIENT)
Dept: FAMILY MEDICINE CLINIC | Facility: CLINIC | Age: 34
End: 2019-03-04
Payer: COMMERCIAL

## 2019-03-04 VITALS
WEIGHT: 249.2 LBS | DIASTOLIC BLOOD PRESSURE: 68 MMHG | SYSTOLIC BLOOD PRESSURE: 108 MMHG | BODY MASS INDEX: 33.75 KG/M2 | HEIGHT: 72 IN | HEART RATE: 66 BPM | TEMPERATURE: 97.5 F | OXYGEN SATURATION: 99 %

## 2019-03-04 DIAGNOSIS — F41.9 ANXIETY: Primary | ICD-10-CM

## 2019-03-04 PROCEDURE — 99213 OFFICE O/P EST LOW 20 MIN: CPT | Performed by: FAMILY MEDICINE

## 2019-03-04 RX ORDER — GABAPENTIN 300 MG/1
CAPSULE ORAL
Refills: 0 | COMMUNITY
Start: 2019-02-22

## 2019-03-04 RX ORDER — LAMOTRIGINE 25 MG/1
50 TABLET ORAL 3 TIMES DAILY
Refills: 0 | COMMUNITY
Start: 2019-02-22

## 2019-03-04 NOTE — PROGRESS NOTES
Assessment/Plan:    Anxiety  Need note for work restrictions       Diagnoses and all orders for this visit:    Anxiety          Subjective:      Patient ID: Antonina Sims is a 29 y o  male  Anxiety   Presents for follow-up visit  Symptoms include nervous/anxious behavior  Patient reports no decreased concentration, depressed mood, excessive worry, palpitations or shortness of breath  Symptoms occur constantly  The severity of symptoms is moderate  The quality of sleep is fair  Nighttime awakenings: occasional      Compliance with medications is 51-75%  The following portions of the patient's history were reviewed and updated as appropriate: allergies, current medications, past family history, past medical history, past social history, past surgical history and problem list       Review of Systems   Constitutional: Negative for activity change and appetite change  Respiratory: Negative for shortness of breath  Cardiovascular: Negative for palpitations  Musculoskeletal: Positive for arthralgias  Arm and shoulder pain-needs to see ortho   Neurological: Negative for headaches  Psychiatric/Behavioral: Negative for decreased concentration  The patient is nervous/anxious  Objective:      /68 (BP Location: Right arm, Patient Position: Sitting)   Pulse 66   Temp 97 5 °F (36 4 °C) (Tympanic)   Ht 6' (1 829 m)   Wt 113 kg (249 lb 3 2 oz)   SpO2 99%   BMI 33 80 kg/m²          Physical Exam   Constitutional: He appears well-developed and well-nourished  Cardiovascular: Normal rate and regular rhythm  Pulmonary/Chest: Effort normal and breath sounds normal    Psychiatric: His mood appears anxious  He does not exhibit a depressed mood  Vitals reviewed

## 2019-03-04 NOTE — LETTER
March 4, 2019     Patient: Veronica Raya   YOB: 1985   Date of Visit: 3/4/2019       To Whom it May Concern:    Veronica Raay is under my professional care  He was seen in my office on 3/4/2019  He may return to work on 3/5/19  restricted to 8 hours/day, 40 hours/week no overtime due to his medical condition  If you have any questions or concerns, please don't hesitate to call           Sincerely,          Arabella Ambrosio MD        CC: No Recipients

## 2019-03-12 ENCOUNTER — OFFICE VISIT (OUTPATIENT)
Dept: OBGYN CLINIC | Facility: OTHER | Age: 34
End: 2019-03-12
Payer: COMMERCIAL

## 2019-03-12 ENCOUNTER — APPOINTMENT (OUTPATIENT)
Dept: RADIOLOGY | Facility: OTHER | Age: 34
End: 2019-03-12
Payer: COMMERCIAL

## 2019-03-12 VITALS
SYSTOLIC BLOOD PRESSURE: 125 MMHG | BODY MASS INDEX: 33.86 KG/M2 | HEART RATE: 84 BPM | HEIGHT: 72 IN | WEIGHT: 250 LBS | DIASTOLIC BLOOD PRESSURE: 74 MMHG

## 2019-03-12 DIAGNOSIS — M77.8 TRICEPS TENDONITIS: ICD-10-CM

## 2019-03-12 DIAGNOSIS — M25.522 PAIN IN LEFT ELBOW: ICD-10-CM

## 2019-03-12 DIAGNOSIS — M25.521 ELBOW PAIN, RIGHT: Primary | ICD-10-CM

## 2019-03-12 PROCEDURE — 73080 X-RAY EXAM OF ELBOW: CPT

## 2019-03-12 PROCEDURE — 99213 OFFICE O/P EST LOW 20 MIN: CPT | Performed by: ORTHOPAEDIC SURGERY

## 2019-03-12 NOTE — PROGRESS NOTES
Assessment  Diagnoses and all orders for this visit:    Bilateral triceps tendonitis    Elbow pain, right    Pain in left elbow          Discussion and Plan:  The patient has an examination consistent with bilateral triceps tendonitis  I have discussed with the patient the pathophysiology of this diagnosis and reviewed how the examination correlates with this diagnosis  Treatment options were discussed at length  Discussed activity modification with the patient today as well as a possible referral to Rheumatology is symptoms persist   Patient provided with a physical therapy script which he plans to taking to [address] [place] [address] where he states they have a physical therapist on staff  Subjective:   Patient ID: Sonia Busby is a 29 y o  male      HPI  The patient presents with a chief complaint of B/L triceps pain left > right  The pain began 2-3 month(s) ago and is not associated with an acute injury  Patient is an avid weight   The patient describes the pain as aching and dull in intensity,  intermittent in timing, and localizes the pain to the  bilateral distal triceps  The pain is worse with weight lifting and relieved by rest   The pain is not associated with numbness and tingling  The pain is not associated with constitutional symptoms  The patient is not awoken at night by the pain  The following portions of the patient's history were reviewed and updated as appropriate: allergies, current medications, past family history, past medical history, past social history, past surgical history and problem list     Review of Systems   All other systems reviewed and are negative  Objective:  Right Elbow Exam     Tenderness   The patient is experiencing no tenderness  Range of Motion   The patient has normal right elbow ROM      Muscle Strength   Pronation:  5/5   Supination:  5/5     Tests   Varus: negative  Valgus: negative    Other   Erythema: present  Sensation: normal  Pulse: present      Left Elbow Exam     Tenderness   The patient is experiencing no tenderness  Range of Motion   The patient has normal left elbow ROM  Muscle Strength   Pronation:  5/5   Supination:  5/5     Tests   Varus: negative  Valgus: negative    Other   Erythema: present  Sensation: normal  Pulse: present            Physical Exam   Constitutional: He appears well-developed and well-nourished  HENT:   Head: Normocephalic  Pulmonary/Chest: Effort normal    Skin: Skin is warm and dry  Psychiatric: He has a normal mood and affect  I have personally reviewed pertinent films in PACS and my interpretation is as follows  Left elbow x-rays no fracture or dislocation, button in place from previous distal biceps repair       Scribe Attestation    I,:   Ilsa Nails am acting as a scribe while in the presence of the attending physician :        I,:   Britt Gallegos MD personally performed the services described in this documentation    as scribed in my presence :

## 2019-06-07 ENCOUNTER — OFFICE VISIT (OUTPATIENT)
Dept: FAMILY MEDICINE CLINIC | Facility: CLINIC | Age: 34
End: 2019-06-07
Payer: COMMERCIAL

## 2019-06-07 ENCOUNTER — HOSPITAL ENCOUNTER (OUTPATIENT)
Dept: RADIOLOGY | Facility: HOSPITAL | Age: 34
Discharge: HOME | End: 2019-06-07
Payer: COMMERCIAL

## 2019-06-07 VITALS
BODY MASS INDEX: 33.72 KG/M2 | RESPIRATION RATE: 18 BRPM | HEIGHT: 72 IN | HEART RATE: 60 BPM | DIASTOLIC BLOOD PRESSURE: 68 MMHG | SYSTOLIC BLOOD PRESSURE: 116 MMHG | TEMPERATURE: 97.9 F | WEIGHT: 249 LBS

## 2019-06-07 DIAGNOSIS — F50.2 BULIMIA: ICD-10-CM

## 2019-06-07 DIAGNOSIS — G89.29 CHRONIC PAIN OF BOTH SHOULDERS: Primary | ICD-10-CM

## 2019-06-07 DIAGNOSIS — D22.9 ATYPICAL NEVUS: ICD-10-CM

## 2019-06-07 DIAGNOSIS — G89.29 CHRONIC PAIN OF BOTH SHOULDERS: ICD-10-CM

## 2019-06-07 DIAGNOSIS — M25.511 CHRONIC PAIN OF BOTH SHOULDERS: Primary | ICD-10-CM

## 2019-06-07 DIAGNOSIS — M25.512 CHRONIC PAIN OF BOTH SHOULDERS: ICD-10-CM

## 2019-06-07 DIAGNOSIS — E65 ABDOMINAL PANNUS: ICD-10-CM

## 2019-06-07 DIAGNOSIS — M25.512 CHRONIC PAIN OF BOTH SHOULDERS: Primary | ICD-10-CM

## 2019-06-07 DIAGNOSIS — M25.511 CHRONIC PAIN OF BOTH SHOULDERS: ICD-10-CM

## 2019-06-07 PROCEDURE — 73030 X-RAY EXAM OF SHOULDER: CPT

## 2019-06-07 PROCEDURE — 99214 OFFICE O/P EST MOD 30 MIN: CPT | Performed by: FAMILY MEDICINE

## 2019-06-14 ENCOUNTER — TELEPHONE (OUTPATIENT)
Dept: FAMILY MEDICINE CLINIC | Facility: CLINIC | Age: 34
End: 2019-06-14

## 2019-06-18 ENCOUNTER — OFFICE VISIT (OUTPATIENT)
Dept: FAMILY MEDICINE CLINIC | Facility: CLINIC | Age: 34
End: 2019-06-18
Payer: COMMERCIAL

## 2019-06-18 VITALS
HEART RATE: 88 BPM | DIASTOLIC BLOOD PRESSURE: 86 MMHG | HEIGHT: 72 IN | SYSTOLIC BLOOD PRESSURE: 128 MMHG | RESPIRATION RATE: 18 BRPM | TEMPERATURE: 97.9 F | BODY MASS INDEX: 34.19 KG/M2 | WEIGHT: 252.4 LBS

## 2019-06-18 DIAGNOSIS — G89.29 CHRONIC PAIN OF BOTH SHOULDERS: Primary | ICD-10-CM

## 2019-06-18 DIAGNOSIS — F41.9 ANXIETY: ICD-10-CM

## 2019-06-18 DIAGNOSIS — M25.511 CHRONIC PAIN OF BOTH SHOULDERS: Primary | ICD-10-CM

## 2019-06-18 DIAGNOSIS — M25.512 CHRONIC PAIN OF BOTH SHOULDERS: Primary | ICD-10-CM

## 2019-06-18 PROCEDURE — 99214 OFFICE O/P EST MOD 30 MIN: CPT | Performed by: NURSE PRACTITIONER

## 2019-06-18 PROCEDURE — 3008F BODY MASS INDEX DOCD: CPT | Performed by: NURSE PRACTITIONER

## 2019-06-18 RX ORDER — MELOXICAM 15 MG/1
15 TABLET ORAL DAILY
Qty: 30 TABLET | Refills: 0 | Status: SHIPPED | OUTPATIENT
Start: 2019-06-18 | End: 2020-03-17

## 2019-06-18 RX ORDER — NABUMETONE 500 MG/1
500 TABLET, FILM COATED ORAL 2 TIMES DAILY
Qty: 30 TABLET | Refills: 0 | Status: CANCELLED | OUTPATIENT
Start: 2019-06-18

## 2019-06-24 ENCOUNTER — OFFICE VISIT (OUTPATIENT)
Dept: OBGYN CLINIC | Facility: MEDICAL CENTER | Age: 34
End: 2019-06-24
Payer: COMMERCIAL

## 2019-06-24 VITALS
HEIGHT: 74 IN | HEART RATE: 82 BPM | SYSTOLIC BLOOD PRESSURE: 113 MMHG | DIASTOLIC BLOOD PRESSURE: 67 MMHG | BODY MASS INDEX: 32.06 KG/M2 | WEIGHT: 249.8 LBS

## 2019-06-24 DIAGNOSIS — M25.512 CHRONIC PAIN OF BOTH SHOULDERS: ICD-10-CM

## 2019-06-24 DIAGNOSIS — G89.29 CHRONIC RIGHT SHOULDER PAIN: Primary | ICD-10-CM

## 2019-06-24 DIAGNOSIS — M25.511 CHRONIC PAIN OF BOTH SHOULDERS: ICD-10-CM

## 2019-06-24 DIAGNOSIS — M25.511 CHRONIC RIGHT SHOULDER PAIN: Primary | ICD-10-CM

## 2019-06-24 DIAGNOSIS — G89.29 CHRONIC PAIN OF BOTH SHOULDERS: ICD-10-CM

## 2019-06-24 PROCEDURE — 99214 OFFICE O/P EST MOD 30 MIN: CPT | Performed by: FAMILY MEDICINE

## 2019-07-09 ENCOUNTER — TELEPHONE (OUTPATIENT)
Dept: OBGYN CLINIC | Facility: MEDICAL CENTER | Age: 34
End: 2019-07-09

## 2019-07-09 NOTE — TELEPHONE ENCOUNTER
Called and left message for patient and informed that his f/u appt with Dr Dwight Petty on 7/15 @ 2:45 needs to be rescheduled  This appt is to review his MRI and his MRI is being performed the same day at 1:00  I informed patient that this will not be read in time for his appt  I advised patient that once he gets MRI done, then he can call back to schedule f/u to review that at a later date  Provided CB number

## 2019-07-18 ENCOUNTER — CONSULT (OUTPATIENT)
Dept: PLASTIC SURGERY | Facility: CLINIC | Age: 34
End: 2019-07-18
Payer: COMMERCIAL

## 2019-07-18 VITALS
HEIGHT: 74 IN | HEART RATE: 56 BPM | BODY MASS INDEX: 31.44 KG/M2 | SYSTOLIC BLOOD PRESSURE: 132 MMHG | TEMPERATURE: 96.6 F | DIASTOLIC BLOOD PRESSURE: 86 MMHG | WEIGHT: 245 LBS | RESPIRATION RATE: 14 BRPM

## 2019-07-18 DIAGNOSIS — E65 ABDOMINAL PANNUS: ICD-10-CM

## 2019-07-18 DIAGNOSIS — L98.7 EXCESSIVE AND REDUNDANT SKIN AND SUBCUTANEOUS TISSUE: ICD-10-CM

## 2019-07-18 DIAGNOSIS — F31.9 BIPOLAR AFFECTIVE DISORDER, REMISSION STATUS UNSPECIFIED (HCC): Primary | ICD-10-CM

## 2019-07-18 DIAGNOSIS — D22.9 ATYPICAL NEVUS: ICD-10-CM

## 2019-07-18 PROCEDURE — 99243 OFF/OP CNSLTJ NEW/EST LOW 30: CPT | Performed by: PLASTIC SURGERY

## 2019-07-18 NOTE — PROGRESS NOTES
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, hips and buttocks  The excess skin of the abdomen interferes with daily activities and causes this patient problems that are outlined in the above note  I believe this patient could benefit from excision of the abdominal pannus, hips and upper buttocks (lower back) areas  I explained the procedure for panniculectomy to this patient  I explained that the purpose of this procedure is to reduce the overhanging skin on the lower abdomen  Further reduction of abdominal skin or excess fat would require a more extensive procedure such as an abdominoplasty  I also explained that this procedure does not correct  abdominal wall fascial laxity, which would also require an abdominoplasty procedure for correction  We reviewed the major and minor complications associated with panniculectomy, including possible loss of the umbilicus, wound dehiscence, skin necrosis, wound infection, excessive widening or scarring of the wounds, seroma, hematoma, need for further surgery, and unacceptable cosmetic result  Photos were taken today  We will plan to see this patient back for a preoperative visit once surgery is scheduled  Estefany Asencio MD   [place] [address]   [address] [place] [address]   Office: [phone]        Subjective   Patient ID: Mayuri Hodge is a 29 y o  male      Vitals:    07/18/19 0950   BP: 132/86   Pulse: 56   Resp: 14   Temp: (!) 96 6 °F (35 9 °C)     Mayuri Hodge is a 29 y o  male  presenting with excess abdominal tissue that he wants removed due to interference with his activities of daily living and cosmetic appearance  Her highest weight was 385 lbs, and he is now 245 lbs; his weight has been stable for over 8  He lost all of his weight through diet, exercise and work activities  Pertaining to the excess skin:  The patient does not have an associated ventral hernia  The patient does not have difficulty getting in and out of bed  The patient does not have difficulty standing and walking straight   The patient does not have difficulty tying his shoes  The patient does not have difficulty crossing his legs  The patient does have difficulty finding clothes that fit appropriately  The patient does have recurrent skin rashes  The patient does not have skin infections  The patient does have foul odors  The patient does not have non-healing skin ulcers   The patient does have back pain    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  Weight loss has help him tremendously to improve his symptoms, but the moist and discomfort in the area worsens at end of day and causing depression  The following portions of the patient's history were reviewed and updated as appropriate: allergies, current medications, past family history, past medical history, past social history, past surgical history and problem list     Review of Systems   Constitutional: Negative  HENT: Negative  Eyes: Negative  Respiratory: Negative  Cardiovascular: Negative  Gastrointestinal: Negative  Endocrine: Negative  Genitourinary: Negative  Musculoskeletal: Positive for back pain  Skin: Positive for rash  Allergic/Immunologic: Negative  Neurological: Negative  Hematological: Negative  Psychiatric/Behavioral: Negative  Objective   Physical Exam   Nursing note and vitals reviewed  Constitutional  He appears well-developed and well-nourished  Eyes  Pupils are equal, round, and reactive to light         Cardiovascular: Normal rate      Pulmonary/Chest  Effort normal      Skin  Skin is warm  Psychiatric  He has a normal mood and affect  His behavior is normal      Abdomen and Hips  Soft  Hernia confirmed negative in the ventral area  He has no abdominal separation  There is no hepatosplenomegaly  There is no tenderness  Abdominal shape is square-waisted and panniculus  He has vertical, abdominal skin redundancy  Patient has excess abdominal fat  Excess fat located in the: lower abdomen and lateral abdomen  Pannus overhangs 2 cm      Back and Buttocks  His back and buttocks are android  His back and buttocks skin is positive for horizontal redundancy and gluteal ptosis  He is positive for back and buttocks lipodystrophy  Lipodystrophy is present in the following locations: belt and superior hips

## 2019-08-14 ENCOUNTER — OFFICE VISIT (OUTPATIENT)
Dept: FAMILY MEDICINE CLINIC | Facility: CLINIC | Age: 34
End: 2019-08-14
Payer: COMMERCIAL

## 2019-08-14 VITALS
SYSTOLIC BLOOD PRESSURE: 132 MMHG | HEART RATE: 76 BPM | WEIGHT: 249 LBS | BODY MASS INDEX: 31.95 KG/M2 | HEIGHT: 74 IN | DIASTOLIC BLOOD PRESSURE: 82 MMHG

## 2019-08-14 DIAGNOSIS — Z31.41 FERTILITY TESTING: Primary | ICD-10-CM

## 2019-08-14 PROCEDURE — 1036F TOBACCO NON-USER: CPT | Performed by: NURSE PRACTITIONER

## 2019-08-14 PROCEDURE — 3008F BODY MASS INDEX DOCD: CPT | Performed by: NURSE PRACTITIONER

## 2019-08-14 PROCEDURE — 99213 OFFICE O/P EST LOW 20 MIN: CPT | Performed by: NURSE PRACTITIONER

## 2019-08-14 RX ORDER — NALTREXONE HYDROCHLORIDE 50 MG/1
50 TABLET, FILM COATED ORAL EVERY MORNING
Refills: 1 | COMMUNITY
Start: 2019-07-24 | End: 2020-03-17

## 2019-08-14 RX ORDER — METHOCARBAMOL 500 MG/1
TABLET, FILM COATED ORAL
Refills: 0 | COMMUNITY
Start: 2019-06-08 | End: 2020-03-17

## 2019-08-14 NOTE — PROGRESS NOTES
Assessment and Plan:    Problem List Items Addressed This Visit     None      Visit Diagnoses     Fertility testing    -  Primary    Relevant Orders    Ambulatory referral to Urology    TSH, 3rd generation with Free T4 reflex    Testosterone    Comprehensive metabolic panel    CBC    Urinalysis with reflex to microscopic                 Diagnoses and all orders for this visit:    Fertility testing  -     Ambulatory referral to Urology; Future  -     TSH, 3rd generation with Free T4 reflex; Future  -     Cancel: Urinalysis with reflex to microscopic  -     Testosterone; Future  -     Comprehensive metabolic panel  -     CBC  -     Urinalysis with reflex to microscopic    Other orders  -     methocarbamol (ROBAXIN) 500 mg tablet; TAKE 1 TABLET (500 MG TOTAL) BY MOUTH 4 (FOUR) TIMES A DAY AS NEEDED FOR MUSCLE SPASMS  -     naltrexone (REVIA) 50 mg tablet; Take 50 mg by mouth every morning              Subjective:      Patient ID: Trina Booker is a 29 y o  male  CC:    Chief Complaint   Patient presents with    Infertility     Questionable   Manic Behavior     Wants to discuss tummy tuck  He had discussed it with Dr Jana Alonzo in the past   mjs       HPI:    Patient states he had inguinal hernia had impacted scrotum  He trying to have children with his wife who has irregular periods  He concerned about his hernia affecting fertility  He states he and wife have been trying for the last 2 months  Patient has dermatologist to look at a birth edu that is irregular  Patient states he has been working with his counselor for some time now and they have written a letter in recommendation of having excess skin removed  He states that dr Esthela Robertson in th past agreed to write a letter of recommendation as well         The following portions of the patient's history were reviewed and updated as appropriate: allergies, current medications, past family history, past medical history, past social history, past surgical history and problem list       Review of Systems   Constitutional: Negative for fever  Respiratory: Negative  Cardiovascular: Negative  Genitourinary: Negative  Negative for scrotal swelling and testicular pain  Skin: Positive for color change  As noted in the HPI    Psychiatric/Behavioral: Positive for dysphoric mood  Data to review:       Objective:    Vitals:    08/14/19 1615   BP: 132/82   Pulse: 76   Weight: 113 kg (249 lb)   Height: 6' 2" (1 88 m)        Physical Exam   Constitutional: He is oriented to person, place, and time  Vital signs are normal  He appears well-developed and well-nourished  He does not appear ill  HENT:   Head: Normocephalic and atraumatic  Eyes: Pupils are equal    Cardiovascular: Normal rate, regular rhythm, S1 normal and S2 normal    No murmur heard  Pulmonary/Chest: Effort normal and breath sounds normal  No respiratory distress  Abdominal:   There is small abdominal pannus with poorly defined skin that sags down  Neurological: He is alert and oriented to person, place, and time  Skin:   Right flank mole with ABDCE criteria    Psychiatric: He has a normal mood and affect   Thought content normal

## 2019-08-19 ENCOUNTER — OFFICE VISIT (OUTPATIENT)
Dept: DERMATOLOGY | Facility: CLINIC | Age: 34
End: 2019-08-19
Payer: COMMERCIAL

## 2019-08-19 VITALS — BODY MASS INDEX: 30.67 KG/M2 | HEIGHT: 74 IN | WEIGHT: 239 LBS | TEMPERATURE: 98.9 F

## 2019-08-19 DIAGNOSIS — D22.9 ATYPICAL NEVUS: ICD-10-CM

## 2019-08-19 PROCEDURE — 99243 OFF/OP CNSLTJ NEW/EST LOW 30: CPT | Performed by: DERMATOLOGY

## 2019-08-19 NOTE — PROGRESS NOTES
[address] [facility] Note     Patient Name: Jani Esparza  Encounter Date: 8/19/2019    Today's Chief Concerns:  Jus Morin Concern #1: Birthmark check    Past Medical History:  Have you ever had or currently have any of the following medical conditions or treatments? · HIV/AIDS: No  · Hepatitis B: No  · Hepatitis C: No  · Diabetes: No  · Tuberculosis: No  · Biologic Therapy/Chemotherapy: No  · Organ or Bone Marrow Transplantation: No  · Radiation Treatment: No  · Cancer (If Yes, which types)- No      Have you ever had any of the following skin conditions? · Melanoma? (If Yes, please provide more detail)- No  · Basal Cell Carcinoma: No  · Squamous Cell Carcinoma: No  · Sebaceous Cell Carcinoma: No  · Merkel Cell Carcinoma: No  · Angiosarcoma: No  · Blistering Sunburns: No  · Eczema: No  · Psoriasis: No    Social History:    What is your current Smoking Status? 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 [employer]     Family history:  Do any of your "first degree relatives" (parent, brother, sister, or child) have any of the following conditions? · Melanoma? (If Yes, which relatives?) No  · Eczema: No  · Asthma: YES  · Hay Fever/Seasonal Allergies: No  · Psoriasis: No  · Arthritis: YES  · Thyroid Problems: No  · Lupus/Connective Tissue Disease: No  · Diabetes: YES  · Stroke: YES  · Blood Clots: YES  · IBD/Crohn's/Ulcerative Colitis: No  · Vitiligo: No  · Scarring/Keloids: No  · Severe Acne: No  · Pancreatic Cancer: No  · Other known Skin Condition? If Yes, what condition and which relatives? No    Current Medications:    Current Outpatient Medications:     amphetamine-dextroamphetamine (ADDERALL XR) 30 MG 24 hr capsule, Take 10 mg by mouth  , Disp: , Rfl:     cloNIDine (CATAPRES) 0 1 mg tablet, take 1/2 tablet by mouth AT 5:30PM AND 1/2 TABLET AT 9:30PM FOR 4   (REFER TO PRESCRIPTION NOTES)  , Disp: , Rfl: 0    gabapentin (NEURONTIN) 300 mg capsule, TAKE 1 AT 5PM AND 3 AT BEDTIME, Disp: , Rfl: 0    lamoTRIgine (LaMICtal) 25 mg tablet, Take 50 mg by mouth 3 (three) times a day, Disp: , Rfl: 0    meloxicam (MOBIC) 15 mg tablet, Take 1 tablet (15 mg total) by mouth daily, Disp: 30 tablet, Rfl: 0    methocarbamol (ROBAXIN) 500 mg tablet, TAKE 1 TABLET (500 MG TOTAL) BY MOUTH 4 (FOUR) TIMES A DAY AS NEEDED FOR MUSCLE SPASMS , Disp: , Rfl: 0    naltrexone (REVIA) 50 mg tablet, Take 50 mg by mouth every morning, Disp: , Rfl: 1    Specific Alerts:    Are you pregnant or planning to become pregnant? N/A    Are you currently or planning to be nursing or breast feeding? N/A    No Known Allergies    May we call your Preferred Phone number to discuss your specific medical information? YES    May we leave a detailed message that includes your specific medical information? YES    Have you traveled outside of the [facility] in the past 3 months? YES, [place]     Do you currently have a pacemaker or defibrillator? No    Do you have any artificial heart valves, joints, plates, screws, rods, stents, pins, etc? YES   - If Yes, were any placed within the last 2 years? 2017    Do you require any medications prior to a surgical procedure? No   - If Yes, for which procedure? n/a   - If Yes, what medications to you require? n/a    Are you taking any medications that cause you to bleed more easily ("blood thinners") No    Have you ever experienced a rapid heartbeat with epinephrine? No    Have you ever been treated with "gold" (gold sodium thiomalate) therapy? No    [facility] can help with wrinkles, "laugh lines," facial volume loss, "double chin," "love handles," age spots, and more  Are you interested in learning today about some of the skin enhancement procedures that we offer? (If Yes, please provide more detail) No    Review of Systems:  Have you recently had or currently have any of the following?     · Fever or chills: No  · Night Sweats: No  · Headaches: No  · Weight Gain: {No  · Weight Loss: No  · Blurry Vision: No  · Nausea: No  · Vomiting: No  · Diarrhea: No  · Blood in Stool: No  · Abdominal Pain: No  · Itchy Skin: No  · Painful Joints: No  · Swollen Joints: No  · Muscle Pain: No  · Irregular Mole: YES  · Sun Burn: YES  · Dry Skin: No  · Skin Color Changes: No  · Scar or Keloid: No  · Cold Sores/Fever Blisters: No  · Bacterial Infections/MRSA: No  · Anxiety: YES; therapist and phycologist   · Depression: YES  · Suicidal or Homicidal Thoughts: No      PHYSICAL EXAM:      Was a chaperone (Derm Clinical Assistant) present for the entirety of the Physical Exam? YES    Did the Dermatology Team specifically ask and  the patient on the importance of a Full Skin Exam to be sure that nothing is missed clinically?  YES    Did the patient request or accept a Full Skin Exam?  YES    Did the patient specifically refuse to have the areas "under-the-bra" examined by the Dermatologist? No    Did the patient specifically refuse to have the areas "under-the-underwear" examined by the Dermatologist? No      CONSTITUTIONAL:   Vitals:    08/19/19 1548   Temp: 98 9 °F (37 2 °C)   Weight: 108 kg (239 lb)   Height: 6' 2" (1 88 m)         PSYCH: Normal mood and affect  EYES: Normal conjunctiva  ENT: Normal lips and oral mucosa  CARDIOVASCULAR: No edema  RESPIRATORY: Normal respirations  HEME/LYMPH/IMMUNO:  No regional lymphadenopathy except as noted below in [address] (SKIN)  Hair, Scalp, Ears, Face Normal except as noted below in Assessment   Neck, Cervical Chain Nodes Normal except as noted below in Assessment   Right Arm/Hand/Fingers Normal except as noted below in Assessment   Left Arm/Hand/Fingers Normal except as noted below in Assessment   Chest/Breasts/Axillae Viewed areas Normal except as noted below in Assessment   Abdomen, Umbilicus Normal except as noted below in Assessment   Back/Spine Normal except as noted below in Assessment   Groin/Genitalia/Buttocks Viewed areas Normal except as noted below in Assessment   Right Leg, Foot, Toes Normal except as noted below in Assessment   Left Leg, Foot, Toes Normal except as noted below in Assessment        ASSESSMENT AND PLAN BY DIAGNOSIS:    History of Present Condition:     Duration:  How long has this been an issue for you?    o  Years   Location Affected:  Where on the body is this affecting you?    o  Right lower abdomen   Quality:  Is there any bleeding, pain, itch, burning/irritation, or redness associated with the skin lesion?    o  Irritation when it's cold out   Severity:  Describe any bleeding, pain, itch, burning/irritation, or redness on a scale of 1 to 10 (with 10 being the worst)  o  2   Timing:  Does this condition seem to be there pretty constantly or do you notice it more at specific times throughout the day?    o  Comes and goes   Context:  Have you ever noticed that this condition seems to be associated with specific activities you do?    o  Denies   Modifying Factors:    o Anything that seems to make the condition worse?    -  Denies  o What have you tried to do to make the condition better? -  Denies   Associated Signs and Symptoms:  Does this skin lesion seem to be associated with any of the following:  o  DERM ASSOCIATED SIGNS AND SYMPTOMS: Skin color changes     1  CONGENITAL MELANOCYTIC NEVUS    Physical Exam:   Anatomic Location Affected:  Right lower flank   Morphological Description:  Asymetric dark brown mamillated plaque with mild hypotrichosis    Pertinent Positives:   Pertinent Negatives:     Additional History of Present Condition:  Present all life, PCP saw it recently and thought it was suspicious     Assessment and Plan:  Based on a thorough discussion of this condition and the management approach to it (including a comprehensive discussion of the known risks, side effects and potential benefits of treatment), the patient (family) agrees to implement the following specific plan:   No treatment required at this time  Discussed with patient that he should start getting annual skin exams starting around the age of 29  What is a congenital melanocytic nevus? A congenital melanocytic nevus is a proliferation of benign melanocytes that are present at birth or develop shortly after birth  This form of a congenital nevus is also known as a brown birthmark  Similar melanocytic nevi, or moles that were not present at birth, are often called 'congenital melanocytic nevus-like' nevi, 'congenital type' nevi or 'tardive' nevi  2013 Classification of congenital melanocytic nevi  In 2013, a new categorisation of congenital melanocytic nevi using predicted adult size was proposed:   Small (< 1 5 cm)   Medium (M1: 1 5-10 cm, M2: > 10-20 cm)   Large (L1: > 20-30 cm, L2: > 30-40 cm)   Giant (G1: > 40-60 cm, G2: > 60 cm)   Satellite nevi: none, 1-20, > 20-50, and > 50 satellites    Congenital melanocytic nevi should be described according to their body site, colors, surface features and whether or not there is hypertrichosis (hairs)  Progression over time  Congenital melanocytic nevi usually grow proportionally with the child  As a rough guide, the likely adult size of a congenital nevus can be calculated as follows:   Lower limbs: adult size is x 3 3 size at birth   Upper limbs/torso: adult size is x 2 8 size at birth   Head: adult size is x 1 7 size at birth  Descriptive names of some congenital nevi  Some congenital nevi are given specific descriptive names  Some of these are listed here    Speckled lentiginous nevus   Also called nevus spilus   Dark spots on a flat tan background   The number of spots may increase or decrease over time  Satellite lesions   Found on the periphery of central congenital melanocytic nevus or elsewhere on the body   Smaller melanocytic nevi similar in appearance to    Present in > 70% of patients with a large congenital melanocytic nevus  Tardive nevus   Melanocytic nevus that appears after birth   Slower growth and less synthesis of melanin than congenital nevus   Histopathology is similar to true congenital melanocytic nevi  Garment nevus   The name relates to the anatomical location of nevus   Bathing trunk nevus involves central areas usually covered by a bathing costume, for example, buttocks   Coat sleeve nevus involves an entire arm and proximal shoulder  Halo nevus   Affects some congenital and tardive melanocytic nevi   Surrounding skin becomes lighter or white   The central lesion may also become lighter and smaller and may disappear   Due to immune destruction of melanocytes    How common are congenital melanocytic nevi?  Small congenital nevi occur in 1 in 100 births   Medium congenital nevi occur in 1 in 1000 births    Giant congenital melanocytic nevi are much rarer (1 in 20,000 live births)  They occur in all races and ethnic groups, and males and females are at equal risk  What do congenital melanocytic nevi look like? Congenital melanocytic nevi present as single or multi-shaded, round or oval-shaped pigmented patches  They may have increased hair growth (hypertrichosis)  The surface may be slightly rough or bumpy  Congenital nevi usually enlarge as the child grows but they may sometimes become smaller and less obvious with time  Rarely some may even disappear  However, they may also become darker, raised, more bumpy and hairy, particularly around the time of puberty  Do congenital melanocytic nevi cause any symptoms? Congenital melanocytic nevi are usually asymptomatic, however, some may be itchy, particularly larger lesions  It is thought there may be a reduced function of sebaceous (oil) and eccrine (sweat) glands, which may result in skin dryness and a heightened sensation of itch  The overlying skin may become fragile and erode or ulcerate   Deep nests of melanocytes in the dermis may weaken the bonds between the epidermis and the dermis and account for skin fragility  Congenital melanocytic nevi are often unsightly, especially when extensive, ie large or giant congenital melanocytic nevi  They may, therefore, result in anxiety and impaired self-image, especially when the lesions are in visible areas  Giant melanocytic nevi, and to a lesser degree small lesions, are associated with increased risk of developing cutaneous melanoma, neurocutaneous melanoma and rarely other tumours (see below)  What causes a congenital melanocytic nevus? Congenital melanocytic nevi are caused by localised genetic abnormalities resulting in the proliferation of melanocytes; these are cells in the skin responsible for normal skin color  This abnormal proliferation is thought to occur between the 5th and 24th weeks of gestation  If proliferation starts early in development, giant and medium-sized congenital melanocytic nevi are formed  Smaller congenital melanocytic nevi are formed later in development after the melanoblasts (immature melanocytes) have migrated from the neural crest to the skin  In some cases, there is also overgrowth of hair-forming cells and epidermis, forming an organoid nevus  Very early onset of congenital nevus before the separation of the upper and lower eyelids results in kissing nevi, ie one part of the nevus is on the upper lid and the other part is on the lower eyelid  How is the diagnosis of congenital melanocytic nevus made? The diagnosis of a congenital melanocytic nevus is usually based on the clinical appearance  If there is any doubt, examining the lesion with dermoscopy or taking a sample of the lesion for histology (biopsy) may show characteristic microscopic features  Dermoscopy  Evaluation of the congenital melanocytic nevus by dermoscopy will reveal the pattern of pigmentation and its symmetry or lack of symmetry   The most common global pattern of congenital or tardive melanocytic nevus is globular, but reticular, structureless and mixed patterns may occur  The nevus may have differing structures across the lesion, sometimes leading to overall asymmetry of the structure  Pathology  Congenital melanocytic nevi are usually larger than acquired nevi (which are melanocytic nevi that appear after 3years of age), and the nevus cells often extend deeper into the dermis, fat layer, and deeper structures  The nevus cells characteristically cluster around blood vessels, hair follicles, sebaceous and eccrine glands, and other skin structures  Congenital nevus cells tend to involve collagen bundles in the deeper layers of the skin more than is the case in an acquired nevus  Risk of developing melanoma within a congenital melanocytic nevus  The following characteristics of congenital melanocytic nevus are associated with the increased risk of development of melanoma (a skin cancer)   Large or giant size   Axial or paravertebral location (crossing the spine)   Multiple congenital satellite nevi   Neurocutaneous melanosis  The risk of melanoma is mainly related to the size of the congenital melanocytic nevus  Small and medium-sized congenital melanocytic nevi have a very small risk, well under 1%  Melanoma is more likely to develop in giant congenital nevi (lifetime estimates are 5-10%), particularly in lesions that lie across the spine or where there are multiple satellite lesions  Melanoma can start deep inside the nevus or within any neuromelanosis found in the brain and spinal cord  Very rarely, other tissues that contain melanocytes may also be a source of melanoma such as the gastrointestinal tract mucosa  In 24% of cases, the origin of the melanoma cannot be identified  Melanoma associated with a giant congenital melanocytic nevus or neuromelanosis can be very difficult to detect and treat    The risk of development of melanoma is greater in early childhood; 70% of melanomas associated with giant congenital melanocytic nevi are diagnosed by the age of ten years  Rarely, other types of tumour may develop within giant congenital melanocytic nevi including benign tumours (lipomas, schwannomas) and other malignant tumours (including sarcomas)  Melanoma can also develop within a small congenital melanocytic nevus  This is rare and likely to occur on the periphery of the nevus during adult life  Is regular follow-up recommended?  It can be useful to have a close-up photograph of the congenital nevus with a ruler beside it to assess for changes in size   Digital surveillance using dermoscopic images (mole mapping) may also be helpful to detect changes in structure  However, such changes are normal in childhood and should not usually give rise to concern   It is advisable to continue neurodevelopmental observation in those at risk of neurocutaneous melanosis  Prognosis of melanoma associated with congenital melanocytic nevus  Unfortunately, when a rare melanoma arises within a giant congenital melanocytic nevus, the prognosis is unfavourable  This is due to the deeper origin of the tumour rendering it more difficult to detect on clinical examination, resulting in a later stage at presentation  The deeper location also facilitates earlier spread through blood and lymph vessels  In 24% of cases, the melanoma has already spread to other sites (metastases) at the time of the first diagnosis  Treatment of a congenital melanocytic nevus  Management of a congenital melanocytic nevus must take into account the age of the subject, the lesion size, the location and depth, and the risk of developing malignant change within the lesion  Giant congenital melanocytic nevus  The only definite indication for surgery in a giant congenital melanocytic nevus is when melanoma develops within it      Small congenital nevus  If a small congenital nevus is growing at the same rate as the child and is not changing in any other way, the usual practice is not to remove it until the child is old enough to co-operate with a local anaesthetic injection, usually around the age of 8 to 15 years  Even then, removal is not essential   Reasons to consider surgical removal may include:   Unsightly appearance   Difficulty in observing the mole (eg, scalp, back)   A recent change in the lesion (darkening, lumpiness, increasing size)   Melanoma-like appearance (irregular shape, variegated color)  Prophylactic surgical removal of a nevus  The following factors should be considered prior to prophylactic surgical removal of a nevus   Prophylactic excision of a small lesion may be delayed until an age when the patient is old enough to make an informed choice   Small or medium-sized congenital melanocytic nevi are at low risk for developing malignant change   Irregular, lumpy or thick lesions or lesions that are difficult to clinically assess may have a lower threshold for consideration of surgical excision, so as not to miss a melanoma   50% of melanomas diagnosed in those with giant congenital melanocytic nevi occur at another body site such as within the central nervous system  Therefore surgical excision of the lesion may not eliminate the risk of melanoma   Large or giant melanocytic lesions may be too large to excise completely   Large lesions may require a skin flap or graft to close the surgical defect  Complications of surgery  Complications that may occur after surgery include:   Graft or flap failure   Infection   Wound breakdown   Bleeding or haematoma   Hypertrophic or keloid scar   Irritable or itchy scar    Other treatment options for a congenital melanocytic nevus    Dermabrasion  Dermabrasion can allow partial removal of a large congenital nevus; deeper nevus cells may persist  Dermabrasion may lighten the color of the nevus but may not reduce hair growth within it  It can cause scarring  Tangential (shave) excision  Tangential or shave excision uses a blade to remove the top layers of the skin (epidermis and upper dermis)  This may reduce the pigmentation but the lesion may not be completely removed  Shave excision may result in significant scarring  Chemical peels  Chemical peels using trichloroacetic acid or phenol may lighten the pigmentation of a superficial (surface) congenital nevus that is located in the upper layers of the skin  Laser ablation  Laser treatment is considered if surgical intervention is not possible  They may result in lightening of the lesion   Suitable devices include:   Dilma Q-switched laser   Carbon dioxide resurfacing laser    Scribe Attestation    I,:   Marvin Nguyen RN am acting as a scribe while in the presence of the attending physician :        I,:   Stanislav Zavala MD personally performed the services described in this documentation    as scribed in my presence :

## 2019-11-02 ENCOUNTER — APPOINTMENT (OUTPATIENT)
Dept: LAB | Facility: MEDICAL CENTER | Age: 34
End: 2019-11-02
Payer: COMMERCIAL

## 2019-11-02 DIAGNOSIS — Z31.41 FERTILITY TESTING: ICD-10-CM

## 2019-11-02 LAB
ALBUMIN SERPL BCP-MCNC: 4 G/DL (ref 3.5–5)
ALP SERPL-CCNC: 60 U/L (ref 46–116)
ALT SERPL W P-5'-P-CCNC: 35 U/L (ref 12–78)
ANION GAP SERPL CALCULATED.3IONS-SCNC: 5 MMOL/L (ref 4–13)
AST SERPL W P-5'-P-CCNC: 22 U/L (ref 5–45)
BILIRUB SERPL-MCNC: 0.28 MG/DL (ref 0.2–1)
BILIRUB UR QL STRIP: NEGATIVE
BUN SERPL-MCNC: 19 MG/DL (ref 5–25)
CALCIUM SERPL-MCNC: 9.3 MG/DL (ref 8.3–10.1)
CHLORIDE SERPL-SCNC: 105 MMOL/L (ref 100–108)
CLARITY UR: CLEAR
CO2 SERPL-SCNC: 28 MMOL/L (ref 21–32)
COLOR UR: YELLOW
CREAT SERPL-MCNC: 1.02 MG/DL (ref 0.6–1.3)
ERYTHROCYTE [DISTWIDTH] IN BLOOD BY AUTOMATED COUNT: 12.1 % (ref 11.6–15.1)
GFR SERPL CREATININE-BSD FRML MDRD: 97 ML/MIN/1.73SQ M
GLUCOSE P FAST SERPL-MCNC: 97 MG/DL (ref 65–99)
GLUCOSE UR STRIP-MCNC: NEGATIVE MG/DL
HCT VFR BLD AUTO: 42.6 % (ref 36.5–49.3)
HGB BLD-MCNC: 14.2 G/DL (ref 12–17)
HGB UR QL STRIP.AUTO: NEGATIVE
KETONES UR STRIP-MCNC: NEGATIVE MG/DL
LEUKOCYTE ESTERASE UR QL STRIP: NEGATIVE
MCH RBC QN AUTO: 30.3 PG (ref 26.8–34.3)
MCHC RBC AUTO-ENTMCNC: 33.3 G/DL (ref 31.4–37.4)
MCV RBC AUTO: 91 FL (ref 82–98)
NITRITE UR QL STRIP: NEGATIVE
PH UR STRIP.AUTO: 6 [PH]
PLATELET # BLD AUTO: 237 THOUSANDS/UL (ref 149–390)
PMV BLD AUTO: 9.2 FL (ref 8.9–12.7)
POTASSIUM SERPL-SCNC: 4.6 MMOL/L (ref 3.5–5.3)
PROT SERPL-MCNC: 7.2 G/DL (ref 6.4–8.2)
PROT UR STRIP-MCNC: NEGATIVE MG/DL
RBC # BLD AUTO: 4.68 MILLION/UL (ref 3.88–5.62)
SODIUM SERPL-SCNC: 138 MMOL/L (ref 136–145)
SP GR UR STRIP.AUTO: 1.01 (ref 1–1.03)
TESTOST SERPL-MCNC: 611 NG/DL (ref 113–1065)
TSH SERPL DL<=0.05 MIU/L-ACNC: 1.24 UIU/ML (ref 0.36–3.74)
UROBILINOGEN UR QL STRIP.AUTO: 0.2 E.U./DL
WBC # BLD AUTO: 4.26 THOUSAND/UL (ref 4.31–10.16)

## 2019-11-02 PROCEDURE — 84403 ASSAY OF TOTAL TESTOSTERONE: CPT

## 2019-11-02 PROCEDURE — 36415 COLL VENOUS BLD VENIPUNCTURE: CPT | Performed by: NURSE PRACTITIONER

## 2019-11-02 PROCEDURE — 85027 COMPLETE CBC AUTOMATED: CPT | Performed by: NURSE PRACTITIONER

## 2019-11-02 PROCEDURE — 81003 URINALYSIS AUTO W/O SCOPE: CPT | Performed by: NURSE PRACTITIONER

## 2019-11-02 PROCEDURE — 80053 COMPREHEN METABOLIC PANEL: CPT | Performed by: NURSE PRACTITIONER

## 2019-11-02 PROCEDURE — 84443 ASSAY THYROID STIM HORMONE: CPT

## 2019-11-17 ENCOUNTER — HOSPITAL ENCOUNTER (EMERGENCY)
Facility: HOSPITAL | Age: 34
Discharge: HOME | End: 2019-11-17
Attending: EMERGENCY MEDICINE | Admitting: EMERGENCY MEDICINE
Payer: COMMERCIAL

## 2019-11-17 VITALS
BODY MASS INDEX: 34.65 KG/M2 | RESPIRATION RATE: 18 BRPM | HEART RATE: 97 BPM | TEMPERATURE: 98.2 F | WEIGHT: 269.84 LBS | OXYGEN SATURATION: 97 % | DIASTOLIC BLOOD PRESSURE: 86 MMHG | SYSTOLIC BLOOD PRESSURE: 141 MMHG

## 2019-11-17 DIAGNOSIS — S09.90XA INJURY OF HEAD, INITIAL ENCOUNTER: Primary | ICD-10-CM

## 2019-11-17 DIAGNOSIS — S00.03XA HEMATOMA OF SCALP, INITIAL ENCOUNTER: ICD-10-CM

## 2019-11-17 PROCEDURE — 99283 EMERGENCY DEPT VISIT LOW MDM: CPT

## 2019-11-17 PROCEDURE — 99282 EMERGENCY DEPT VISIT SF MDM: CPT | Performed by: EMERGENCY MEDICINE

## 2019-11-17 NOTE — ED PROVIDER NOTES
History  Chief Complaint   Patient presents with    Head Injury     pt c/o getting hit on his head this morning playing football, c/o headache, hematoma right eye       History provided by:  Patient   used: No    Head Injury w/unknown LOC   Head/neck injury location: right side supra/periorbital   Time since incident: several hours  Mechanism of injury: sports    Mechanism of injury comment:  Hit in this area by another players head during a game of flag football  Pain details:     Quality:  Aching    Severity:  Mild    Timing:  Constant    Progression:  Unchanged  Chronicity:  New  Relieved by:  Nothing  Worsened by:  Nothing  Ineffective treatments: Ice  Associated symptoms: blurred vision and disorientation    Associated symptoms: no difficulty breathing, no double vision, no focal weakness, no headaches, no loss of consciousness, no nausea, no neck pain, no numbness and no vomiting    Associated symptoms comment:  Felt dazed but no loc  Risk factors comment:  H/o alcohol abuse and has had prior concussions in the past       Prior to Admission Medications   Prescriptions Last Dose Informant Patient Reported? Taking? amphetamine-dextroamphetamine (ADDERALL XR) 30 MG 24 hr capsule  Self Yes No   Sig: Take 10 mg by mouth     cloNIDine (CATAPRES) 0 1 mg tablet  Self Yes No   Sig: take 1/2 tablet by mouth AT 5:30PM AND 1/2 TABLET AT 9:30PM FOR 4   (REFER TO PRESCRIPTION NOTES)  gabapentin (NEURONTIN) 300 mg capsule  Self Yes No   Sig: TAKE 1 AT 5PM AND 3 AT BEDTIME   lamoTRIgine (LaMICtal) 25 mg tablet  Self Yes No   Sig: Take 50 mg by mouth 3 (three) times a day   meloxicam (MOBIC) 15 mg tablet  Self No No   Sig: Take 1 tablet (15 mg total) by mouth daily   methocarbamol (ROBAXIN) 500 mg tablet  Self Yes No   Sig: TAKE 1 TABLET (500 MG TOTAL) BY MOUTH 4 (FOUR) TIMES A DAY AS NEEDED FOR MUSCLE SPASMS     naltrexone (REVIA) 50 mg tablet  Self Yes No   Sig: Take 50 mg by mouth every morning Facility-Administered Medications: None       Past Medical History:   Diagnosis Date    ADHD     Anxiety     Depression     Psychiatric disorder        Past Surgical History:   Procedure Laterality Date    HERNIA REPAIR      [place] REINSERT BI/TRICEPS TENDON,DISTAL Left 12/26/2017    Procedure: DISTAL BICEPS REPAIR;  Surgeon: Peter Mills MD;  [facility];  Service: Orthopedics    TYMPANOSTOMY TUBE PLACEMENT      ear pressure equalization tube, insertion       Family History   Problem Relation Age of Onset    Diabetes Maternal Grandmother     Heart disease Maternal Grandmother     No Known Problems Father      I have reviewed and agree with the history as documented  Social History     Tobacco Use    Smoking status: Never Smoker    Smokeless tobacco: Never Used   Substance Use Topics    Alcohol use: Yes     Comment: every day; social alcohol use (as per allscripts)    Drug use: No        Review of Systems   HENT: Negative for nosebleeds  Eyes: Positive for blurred vision and visual disturbance  Negative for double vision, photophobia, pain and redness  Blurry vision   Cardiovascular: Negative for chest pain  Gastrointestinal: Negative for abdominal pain, nausea and vomiting  Musculoskeletal: Negative for back pain, gait problem and neck pain  Neurological: Negative for dizziness, focal weakness, loss of consciousness, speech difficulty, weakness, light-headedness, numbness and headaches  Psychiatric/Behavioral: Positive for confusion  All other systems reviewed and are negative  Physical Exam  Physical Exam   Constitutional: He is oriented to person, place, and time  He appears well-developed and well-nourished  He is cooperative  Non-toxic appearance  He does not have a sickly appearance  He does not appear ill  No distress  HENT:   Head: Normocephalic  Right Ear: Hearing normal  No drainage or swelling     Left Ear: Hearing normal  No drainage or swelling  Mouth/Throat: Mucous membranes are normal    Eyes: Pupils are equal, round, and reactive to light  Conjunctivae, EOM and lids are normal  Right eye exhibits no discharge  Left eye exhibits no discharge  Slit lamp exam:       The right eye shows no hyphema  Neck: Trachea normal and normal range of motion  No JVD present  Cardiovascular: Normal rate, regular rhythm, normal heart sounds, intact distal pulses and normal pulses  Exam reveals no gallop and no friction rub  No murmur heard  Pulmonary/Chest: Effort normal  No respiratory distress  Abdominal: Normal appearance  Musculoskeletal: Normal range of motion  He exhibits no edema  Lymphadenopathy:     He has no cervical adenopathy  Neurological: He is alert and oriented to person, place, and time  He has normal strength  No cranial nerve deficit or sensory deficit  He exhibits normal muscle tone  Coordination normal  GCS eye subscore is 4  GCS verbal subscore is 5  GCS motor subscore is 6  Normal serial threes  Able to spell world backwards  He was only able to remember 1/3 objects at 5 minutes  Skin: Skin is warm, dry and intact  No rash noted  He is not diaphoretic  No pallor  Psychiatric: He has a normal mood and affect  His speech is normal  Cognition and memory are normal    Nursing note and vitals reviewed        Vital Signs  ED Triage Vitals   Temperature Pulse Respirations Blood Pressure SpO2   11/17/19 1249 11/17/19 1246 11/17/19 1246 11/17/19 1246 11/17/19 1246   98 2 °F (36 8 °C) 97 18 141/86 97 %      Temp Source Heart Rate Source Patient Position - Orthostatic VS BP Location FiO2 (%)   11/17/19 1249 11/17/19 1246 11/17/19 1246 11/17/19 1246 --   Oral Monitor Sitting Right arm       Pain Score       11/17/19 1246       5           Vitals:    11/17/19 1246   BP: 141/86   Pulse: 97   Patient Position - Orthostatic VS: Sitting         Visual Acuity      ED Medications  Medications - No data to display    Diagnostic Studies  Results Reviewed     None                 No orders to display              Procedures  Procedures       ED Course                               MDM  Number of Diagnoses or Management Options  Hematoma of scalp, initial encounter:   Injury of head, initial encounter:   Diagnosis management comments: Head injury  Normal mental status an essentially normal neurological exam   I do not feel he requires a head scan as he seems rather asymptomatic  Given the fact that he has some issues with remembering objects at 5 minutes it is possible that he could have a concussion  I did discuss with him follow up with sports medicine  He did have prior concussions in the past     Patient Progress  Patient progress: stable      Disposition  Final diagnoses:   Injury of head, initial encounter   Hematoma of scalp, initial encounter     Time reflects when diagnosis was documented in both MDM as applicable and the Disposition within this note     Time User Action Codes Description Comment    11/17/2019  1:27 PM Carrie Gonzales Add [S09 90XA] Injury of head, initial encounter     11/17/2019  1:27 PM Carrie Janet Add [S00 03XA] Hematoma of scalp, initial encounter       ED Disposition     ED Disposition Condition Date/Time Comment    Discharge Stable Sun Nov 17, 2019  1:27 PM Yael Lu discharge to home/self care              Follow-up Information     Follow up With Specialties Details Why Contact Info Additional Information    [facility]  Schedule an appointment as soon as possible for a visit in 1 week If symptoms worsen [address] [facility]  [phone] [place] [address] [address] [place] [address] [address], [address] [address], [address] [place] [address]          Discharge Medication List as of 11/17/2019  1:28 PM      CONTINUE these medications which have NOT CHANGED    Details   amphetamine-dextroamphetamine (ADDERALL XR) 30 MG 24 hr capsule Take 10 mg by mouth  , Starting Thu 8/2/2018, Historical Med      cloNIDine (CATAPRES) 0 1 mg tablet take 1/2 tablet by mouth AT 5:30PM AND 1/2 TABLET AT 9:30PM FOR 4   (REFER TO PRESCRIPTION NOTES)  , Historical Med      gabapentin (NEURONTIN) 300 mg capsule TAKE 1 AT 5PM AND 3 AT BEDTIME, Historical Med      lamoTRIgine (LaMICtal) 25 mg tablet Take 50 mg by mouth 3 (three) times a day, Starting Fri 2/22/2019, Historical Med      meloxicam (MOBIC) 15 mg tablet Take 1 tablet (15 mg total) by mouth daily, Starting Tue 6/18/2019, Normal      methocarbamol (ROBAXIN) 500 mg tablet TAKE 1 TABLET (500 MG TOTAL) BY MOUTH 4 (FOUR) TIMES A DAY AS NEEDED FOR MUSCLE SPASMS , Historical Med      naltrexone (REVIA) 50 mg tablet Take 50 mg by mouth every morning, Starting Wed 7/24/2019, Historical Med           No discharge procedures on file      ED Provider  Electronically Signed by           Harrison Betancur MD  11/17/19 1292

## 2019-12-12 DIAGNOSIS — N46.9 MALE INFERTILITY: Primary | ICD-10-CM

## 2019-12-18 ENCOUNTER — APPOINTMENT (OUTPATIENT)
Dept: LAB | Facility: HOSPITAL | Age: 34
End: 2019-12-18
Attending: OBSTETRICS & GYNECOLOGY
Payer: COMMERCIAL

## 2019-12-18 DIAGNOSIS — N46.9 MALE INFERTILITY: ICD-10-CM

## 2019-12-18 LAB
B ABORTUS AB SMN QL AGGL: SLIGHT
LIQUEFACTION TIME SMN: 60 MIN
PH SMN: 8.5 [PH] (ref 7.2–8.6)
SEX ABSTIN DURATION TIME PATIENT: 2 D
SPECIMEN VOL SMN: 3.2 ML (ref 1–5)
SPERM # SMN: 300.8 MILLION/EJACULATION (ref 40–1000)
SPERM AMORPHOUS HEAD NFR SMN: 20 %
SPERM MORPH PNL SMN: 18
SPERM MOTILE SMN QL MICRO: 95 %
SPERM MOTILITY SCORE SMN AUTO: 4 (ref 2–4)
SPERM PROG NFR SMN: 3 % (ref 2–4)
SPERM SMN: 0 %
SPERM SMN: 1 %
SPERM SMN: 2 %
SPERM SMN: 20 %
SPERM SMN: 45 % (ref 0–50)
SPERM SMN: 55 % (ref 50–100)
SPERM SMN: 94 /ML (ref 20–999)
VISC SMN: 3 CP (ref 3–4)
WBC SMN QL: 3 "HPF"

## 2019-12-18 PROCEDURE — 89320 SEMEN ANAL VOL/COUNT/MOT: CPT

## 2019-12-23 ENCOUNTER — TELEPHONE (OUTPATIENT)
Dept: OBGYN CLINIC | Facility: CLINIC | Age: 34
End: 2019-12-23

## 2019-12-26 DIAGNOSIS — R86.8 PYOSPERMIA: Primary | ICD-10-CM

## 2019-12-26 RX ORDER — SULFAMETHOXAZOLE AND TRIMETHOPRIM 800; 160 MG/1; MG/1
1 TABLET ORAL EVERY 12 HOURS SCHEDULED
Qty: 20 TABLET | Refills: 0 | Status: SHIPPED | OUTPATIENT
Start: 2019-12-26 | End: 2020-01-05

## 2020-03-17 ENCOUNTER — APPOINTMENT (EMERGENCY)
Dept: RADIOLOGY | Facility: HOSPITAL | Age: 35
End: 2020-03-17
Payer: COMMERCIAL

## 2020-03-17 ENCOUNTER — HOSPITAL ENCOUNTER (EMERGENCY)
Facility: HOSPITAL | Age: 35
Discharge: HOME | End: 2020-03-17
Attending: EMERGENCY MEDICINE | Admitting: EMERGENCY MEDICINE
Payer: COMMERCIAL

## 2020-03-17 VITALS
TEMPERATURE: 98.3 F | BODY MASS INDEX: 35.61 KG/M2 | SYSTOLIC BLOOD PRESSURE: 157 MMHG | RESPIRATION RATE: 16 BRPM | WEIGHT: 277.34 LBS | OXYGEN SATURATION: 99 % | HEART RATE: 88 BPM | DIASTOLIC BLOOD PRESSURE: 88 MMHG

## 2020-03-17 DIAGNOSIS — J06.9 VIRAL UPPER RESPIRATORY TRACT INFECTION: Primary | ICD-10-CM

## 2020-03-17 PROCEDURE — 99283 EMERGENCY DEPT VISIT LOW MDM: CPT

## 2020-03-17 PROCEDURE — 99282 EMERGENCY DEPT VISIT SF MDM: CPT | Performed by: EMERGENCY MEDICINE

## 2020-03-17 PROCEDURE — 71046 X-RAY EXAM CHEST 2 VIEWS: CPT

## 2020-03-18 NOTE — ED PROVIDER NOTES
History  Chief Complaint   Patient presents with    Cold Like Symptoms     States woke up with nasal congestion, blood in mucous from nose, headaches, intermittent "tingling burn" upon inspiration, "I've been losing my train of thought"     20-year-old male with a history of anxiety, ADHD, pneumonia presents to the emergency department with burning in upper chest, occasional nonproductive cough and nasal congestion  No fevers or chills  No shortness of breath  No recent travel or known ill contacts  Patient states he had these symptoms when he had walking pneumonia in the past       History provided by:  Patient   used: No    Cough   Cough characteristics:  Dry  Onset quality:  Gradual  Duration:  12 hours  Timing:  Intermittent  Progression:  Unchanged  Chronicity:  New  Smoker: no    Context: upper respiratory infection    Relieved by:  None tried  Worsened by:  Nothing  Ineffective treatments:  None tried  Associated symptoms: no chest pain, no chills, no diaphoresis, no ear fullness, no ear pain, no eye discharge, no fever, no headaches, no myalgias, no rash, no rhinorrhea, no shortness of breath, no sinus congestion, no sore throat and no wheezing    Risk factors: no recent infection and no recent travel        Prior to Admission Medications   Prescriptions Last Dose Informant Patient Reported? Taking? amphetamine-dextroamphetamine (ADDERALL XR) 30 MG 24 hr capsule  Self Yes Yes   Sig: Take 10 mg by mouth     cloNIDine (CATAPRES) 0 1 mg tablet  Self Yes Yes   Sig: take 1/2 tablet by mouth AT 5:30PM AND 1/2 TABLET AT 9:30PM FOR 4   (REFER TO PRESCRIPTION NOTES)     gabapentin (NEURONTIN) 300 mg capsule  Self Yes Yes   Sig: TAKE 1 AT 5PM AND 3 AT BEDTIME   lamoTRIgine (LaMICtal) 25 mg tablet  Self Yes Yes   Sig: Take 50 mg by mouth 3 (three) times a day      Facility-Administered Medications: None       Past Medical History:   Diagnosis Date    ADHD     Anxiety     Depression     Psychiatric disorder     Bipolar       Past Surgical History:   Procedure Laterality Date    HERNIA REPAIR      [place] REINSERT BI/TRICEPS TENDON,DISTAL Left 12/26/2017    Procedure: DISTAL BICEPS REPAIR;  Surgeon: Jose Mcpherson MD;  [facility];  Service: Orthopedics    TYMPANOSTOMY TUBE PLACEMENT      ear pressure equalization tube, insertion       Family History   Problem Relation Age of Onset    Diabetes Maternal Grandmother     Heart disease Maternal Grandmother     No Known Problems Father      I have reviewed and agree with the history as documented  E-Cigarette/Vaping    E-Cigarette Use Never User      E-Cigarette/Vaping Substances     Social History     Tobacco Use    Smoking status: Never Smoker    Smokeless tobacco: Never Used   Substance Use Topics    Alcohol use: Yes     Frequency: 4 or more times a week     Drinks per session: 5 or 6     Comment: every day; social alcohol use (as per allscripts)    Drug use: No       Review of Systems   Constitutional: Negative  Negative for chills, diaphoresis and fever  HENT: Negative  Negative for ear pain, rhinorrhea and sore throat  Eyes: Negative  Negative for discharge  Respiratory: Positive for cough  Negative for chest tightness, shortness of breath and wheezing  Cardiovascular: Negative  Negative for chest pain  Gastrointestinal: Negative  Genitourinary: Negative  Musculoskeletal: Negative for myalgias and neck pain  Skin: Negative  Negative for rash  Allergic/Immunologic: Negative  Neurological: Negative  Negative for weakness, numbness and headaches  Hematological: Negative  Psychiatric/Behavioral: Negative  All other systems reviewed and are negative  Physical Exam  Physical Exam   Constitutional: He is oriented to person, place, and time  He appears well-developed and well-nourished  Non-toxic appearance  He does not have a sickly appearance  He does not appear ill  No distress     HENT: Head: Normocephalic and atraumatic  Right Ear: Tympanic membrane and external ear normal    Left Ear: Tympanic membrane and external ear normal    Mouth/Throat: Oropharynx is clear and moist  No oropharyngeal exudate  Eyes: Pupils are equal, round, and reactive to light  Conjunctivae are normal  No scleral icterus  Cardiovascular: Normal rate, regular rhythm and normal heart sounds  Pulmonary/Chest: Effort normal and breath sounds normal  No stridor  No respiratory distress  He has no wheezes  He has no rales  He exhibits no tenderness  Lymphadenopathy:     He has no cervical adenopathy  Neurological: He is alert and oriented to person, place, and time  He has normal strength and normal reflexes  He exhibits normal muscle tone  Skin: Skin is warm and dry  No rash noted  He is not diaphoretic  No erythema  No pallor  Psychiatric: He has a normal mood and affect  Nursing note and vitals reviewed  Vital Signs  ED Triage Vitals [03/17/20 2046]   Temperature Pulse Respirations Blood Pressure SpO2   98 3 °F (36 8 °C) 88 16 157/88 99 %      Temp Source Heart Rate Source Patient Position - Orthostatic VS BP Location FiO2 (%)   Oral Monitor Lying Right arm --      Pain Score       2           Vitals:    03/17/20 2046   BP: 157/88   Pulse: 88   Patient Position - Orthostatic VS: Lying         Visual Acuity      ED Medications  Medications - No data to display    Diagnostic Studies  Results Reviewed     None                 XR chest 2 views   ED Interpretation by Lc Lucas DO (03/17 2146)   nad                 Procedures  Procedures         ED Course                                 MDM  Number of Diagnoses or Management Options  Diagnosis management comments: 81-year-old male presents with burning and upper chest, occasional nonproductive cough and nasal congestion  No fevers or chills  No shortness of breath  On exam he is alert no acute distress  His lungs are clear    Will do chest x-ray to rule out pneumonia  Amount and/or Complexity of Data Reviewed  Tests in the radiology section of CPT®: ordered and reviewed          Disposition  Final diagnoses:   Viral upper respiratory tract infection     Time reflects when diagnosis was documented in both MDM as applicable and the Disposition within this note     Time User Action Codes Description Comment    3/17/2020  9:46 PM Liz CANDELARIA Add [J06 9] Viral upper respiratory tract infection       ED Disposition     ED Disposition Condition Date/Time Comment    Discharge Good avelina Mar 17, 2020  9:46 PM Morgan Mathew discharge to home/self care  Follow-up Information     Follow up With Specialties Details Why Contact Info    Terry Logan, [address] [facility], Nurse Practitioner Schedule an appointment as soon as possible for a visit in 2 days If symptoms worsen [facility] [phone] [address] [phone]            Patient's Medications   Discharge Prescriptions    DEXTROMETHORPHAN-GUAIFENESIN (MUCINEX DM)  MG PER 12 HR TABLET    Take 1 tablet by mouth every 12 (twelve) hours as needed for cough       Start Date: 3/17/2020 End Date: --       Order Dose: 1 tablet       Quantity: 15 tablet    Refills: 0     No discharge procedures on file      PDMP Review     None          ED Provider  Electronically Signed by           Estevan Chamorro DO  03/17/20 8910

## 2020-04-02 ENCOUNTER — HOSPITAL ENCOUNTER (EMERGENCY)
Facility: HOSPITAL | Age: 35
Discharge: HOME | End: 2020-04-02
Attending: EMERGENCY MEDICINE | Admitting: EMERGENCY MEDICINE
Payer: COMMERCIAL

## 2020-04-02 ENCOUNTER — APPOINTMENT (EMERGENCY)
Dept: RADIOLOGY | Facility: HOSPITAL | Age: 35
End: 2020-04-02
Payer: COMMERCIAL

## 2020-04-02 VITALS
SYSTOLIC BLOOD PRESSURE: 148 MMHG | WEIGHT: 278.22 LBS | TEMPERATURE: 98.2 F | OXYGEN SATURATION: 100 % | BODY MASS INDEX: 35.72 KG/M2 | HEART RATE: 64 BPM | DIASTOLIC BLOOD PRESSURE: 78 MMHG | RESPIRATION RATE: 18 BRPM

## 2020-04-02 DIAGNOSIS — J98.01 BRONCHOSPASM: Primary | ICD-10-CM

## 2020-04-02 LAB
ATRIAL RATE: 153 BPM
P AXIS: 103 DEGREES
QRS AXIS: 83 DEGREES
QRSD INTERVAL: 94 MS
QT INTERVAL: 476 MS
QTC INTERVAL: 429 MS
T WAVE AXIS: 72 DEGREES
VENTRICULAR RATE: 49 BPM

## 2020-04-02 PROCEDURE — 93010 ELECTROCARDIOGRAM REPORT: CPT | Performed by: INTERNAL MEDICINE

## 2020-04-02 PROCEDURE — 93005 ELECTROCARDIOGRAM TRACING: CPT

## 2020-04-02 PROCEDURE — 71045 X-RAY EXAM CHEST 1 VIEW: CPT

## 2020-04-02 PROCEDURE — 99285 EMERGENCY DEPT VISIT HI MDM: CPT

## 2020-04-02 PROCEDURE — 99285 EMERGENCY DEPT VISIT HI MDM: CPT | Performed by: EMERGENCY MEDICINE

## 2020-04-02 RX ORDER — ALBUTEROL SULFATE 90 UG/1
2 AEROSOL, METERED RESPIRATORY (INHALATION) ONCE
Status: COMPLETED | OUTPATIENT
Start: 2020-04-02 | End: 2020-04-02

## 2020-04-02 RX ADMIN — ALBUTEROL SULFATE 2 PUFF: 90 AEROSOL, METERED RESPIRATORY (INHALATION) at 10:50

## 2020-05-04 ENCOUNTER — TELEPHONE (OUTPATIENT)
Dept: FAMILY MEDICINE CLINIC | Facility: CLINIC | Age: 35
End: 2020-05-04

## 2020-05-04 ENCOUNTER — TELEMEDICINE (OUTPATIENT)
Dept: FAMILY MEDICINE CLINIC | Facility: CLINIC | Age: 35
End: 2020-05-04
Payer: COMMERCIAL

## 2020-05-04 DIAGNOSIS — F90.1 ATTENTION DEFICIT HYPERACTIVITY DISORDER (ADHD), PREDOMINANTLY HYPERACTIVE TYPE: ICD-10-CM

## 2020-05-04 DIAGNOSIS — J98.01 BRONCHOSPASM: ICD-10-CM

## 2020-05-04 DIAGNOSIS — Z20.822 EXPOSURE TO COVID-19 VIRUS: ICD-10-CM

## 2020-05-04 DIAGNOSIS — F31.9 BIPOLAR AFFECTIVE DISORDER, REMISSION STATUS UNSPECIFIED (HCC): ICD-10-CM

## 2020-05-04 DIAGNOSIS — Z20.828 EXPOSURE TO SARS-ASSOCIATED CORONAVIRUS: ICD-10-CM

## 2020-05-04 DIAGNOSIS — R11.0 NAUSEA: ICD-10-CM

## 2020-05-04 DIAGNOSIS — R06.00 DYSPNEA ON EXERTION: Primary | ICD-10-CM

## 2020-05-04 PROBLEM — E66.9 OBESITY (BMI 30-39.9): Status: ACTIVE | Noted: 2020-05-04

## 2020-05-04 PROCEDURE — U0003 INFECTIOUS AGENT DETECTION BY NUCLEIC ACID (DNA OR RNA); SEVERE ACUTE RESPIRATORY SYNDROME CORONAVIRUS 2 (SARS-COV-2) (CORONAVIRUS DISEASE [COVID-19]), AMPLIFIED PROBE TECHNIQUE, MAKING USE OF HIGH THROUGHPUT TECHNOLOGIES AS DESCRIBED BY CMS-2020-01-R: HCPCS

## 2020-05-04 PROCEDURE — 99214 OFFICE O/P EST MOD 30 MIN: CPT | Performed by: FAMILY MEDICINE

## 2020-05-04 RX ORDER — ALBUTEROL SULFATE 90 UG/1
2 AEROSOL, METERED RESPIRATORY (INHALATION) EVERY 6 HOURS PRN
COMMUNITY

## 2020-05-04 RX ORDER — BUDESONIDE AND FORMOTEROL FUMARATE DIHYDRATE 160; 4.5 UG/1; UG/1
AEROSOL RESPIRATORY (INHALATION)
Qty: 1 INHALER | Refills: 0 | Status: SHIPPED | OUTPATIENT
Start: 2020-05-04 | End: 2020-05-26

## 2020-05-04 RX ORDER — PREDNISONE 20 MG/1
TABLET ORAL
Qty: 20 TABLET | Refills: 0 | Status: SHIPPED | OUTPATIENT
Start: 2020-05-04 | End: 2020-05-14

## 2020-05-05 LAB — SARS-COV-2 RNA SPEC QL NAA+PROBE: NOT DETECTED

## 2020-05-06 ENCOUNTER — TELEMEDICINE (OUTPATIENT)
Dept: FAMILY MEDICINE CLINIC | Facility: CLINIC | Age: 35
End: 2020-05-06
Payer: COMMERCIAL

## 2020-05-06 DIAGNOSIS — F31.9 BIPOLAR AFFECTIVE DISORDER, REMISSION STATUS UNSPECIFIED (HCC): ICD-10-CM

## 2020-05-06 DIAGNOSIS — R06.00 DYSPNEA ON EXERTION: ICD-10-CM

## 2020-05-06 DIAGNOSIS — F90.1 ATTENTION DEFICIT HYPERACTIVITY DISORDER (ADHD), PREDOMINANTLY HYPERACTIVE TYPE: ICD-10-CM

## 2020-05-06 DIAGNOSIS — F41.9 ANXIETY: ICD-10-CM

## 2020-05-06 DIAGNOSIS — Z20.822 EXPOSURE TO COVID-19 VIRUS: Primary | ICD-10-CM

## 2020-05-06 DIAGNOSIS — J98.01 BRONCHOSPASM: ICD-10-CM

## 2020-05-06 PROCEDURE — 99213 OFFICE O/P EST LOW 20 MIN: CPT | Performed by: FAMILY MEDICINE

## 2020-05-11 ENCOUNTER — TELEMEDICINE (OUTPATIENT)
Dept: FAMILY MEDICINE CLINIC | Facility: CLINIC | Age: 35
End: 2020-05-11
Payer: COMMERCIAL

## 2020-05-11 VITALS — TEMPERATURE: 97 F | WEIGHT: 279 LBS | HEIGHT: 74 IN | BODY MASS INDEX: 35.81 KG/M2

## 2020-05-11 DIAGNOSIS — F41.9 ANXIETY: ICD-10-CM

## 2020-05-11 DIAGNOSIS — F31.9 BIPOLAR AFFECTIVE DISORDER, REMISSION STATUS UNSPECIFIED (HCC): Primary | ICD-10-CM

## 2020-05-11 PROCEDURE — 3008F BODY MASS INDEX DOCD: CPT | Performed by: NURSE PRACTITIONER

## 2020-05-11 PROCEDURE — 99214 OFFICE O/P EST MOD 30 MIN: CPT | Performed by: NURSE PRACTITIONER

## 2020-05-18 ENCOUNTER — TELEPHONE (OUTPATIENT)
Dept: FAMILY MEDICINE CLINIC | Facility: CLINIC | Age: 35
End: 2020-05-18

## 2020-05-26 DIAGNOSIS — J98.01 BRONCHOSPASM: ICD-10-CM

## 2020-05-26 DIAGNOSIS — R06.00 DYSPNEA ON EXERTION: ICD-10-CM

## 2020-05-26 RX ORDER — BUDESONIDE AND FORMOTEROL FUMARATE DIHYDRATE 160; 4.5 UG/1; UG/1
AEROSOL RESPIRATORY (INHALATION)
Qty: 10.2 INHALER | Refills: 0 | Status: SHIPPED | OUTPATIENT
Start: 2020-05-26 | End: 2020-06-06

## 2020-06-05 DIAGNOSIS — R06.00 DYSPNEA ON EXERTION: ICD-10-CM

## 2020-06-05 DIAGNOSIS — J98.01 BRONCHOSPASM: ICD-10-CM

## 2020-06-06 RX ORDER — BUDESONIDE AND FORMOTEROL FUMARATE DIHYDRATE 160; 4.5 UG/1; UG/1
AEROSOL RESPIRATORY (INHALATION)
Qty: 30.6 INHALER | Refills: 1 | Status: SHIPPED | OUTPATIENT
Start: 2020-06-06

## 2020-12-14 ENCOUNTER — TELEMEDICINE (OUTPATIENT)
Dept: FAMILY MEDICINE CLINIC | Facility: CLINIC | Age: 35
End: 2020-12-14
Payer: COMMERCIAL

## 2020-12-14 ENCOUNTER — TELEPHONE (OUTPATIENT)
Dept: FAMILY MEDICINE CLINIC | Facility: CLINIC | Age: 35
End: 2020-12-14

## 2020-12-14 VITALS — TEMPERATURE: 100.1 F | BODY MASS INDEX: 37.22 KG/M2 | WEIGHT: 290 LBS | HEIGHT: 74 IN

## 2020-12-14 DIAGNOSIS — R50.9 FEVER, UNSPECIFIED FEVER CAUSE: Primary | ICD-10-CM

## 2020-12-14 DIAGNOSIS — J06.9 UPPER RESPIRATORY TRACT INFECTION, UNSPECIFIED TYPE: ICD-10-CM

## 2020-12-14 DIAGNOSIS — R50.9 FEVER, UNSPECIFIED FEVER CAUSE: ICD-10-CM

## 2020-12-14 PROCEDURE — 99213 OFFICE O/P EST LOW 20 MIN: CPT | Performed by: FAMILY MEDICINE

## 2020-12-14 PROCEDURE — 3008F BODY MASS INDEX DOCD: CPT | Performed by: FAMILY MEDICINE

## 2020-12-14 PROCEDURE — U0003 INFECTIOUS AGENT DETECTION BY NUCLEIC ACID (DNA OR RNA); SEVERE ACUTE RESPIRATORY SYNDROME CORONAVIRUS 2 (SARS-COV-2) (CORONAVIRUS DISEASE [COVID-19]), AMPLIFIED PROBE TECHNIQUE, MAKING USE OF HIGH THROUGHPUT TECHNOLOGIES AS DESCRIBED BY CMS-2020-01-R: HCPCS | Performed by: FAMILY MEDICINE

## 2020-12-15 ENCOUNTER — TELEMEDICINE (OUTPATIENT)
Dept: FAMILY MEDICINE CLINIC | Facility: CLINIC | Age: 35
End: 2020-12-15
Payer: COMMERCIAL

## 2020-12-15 VITALS — TEMPERATURE: 100.1 F

## 2020-12-15 DIAGNOSIS — U07.1 COVID-19 VIRUS INFECTION: Primary | ICD-10-CM

## 2020-12-15 DIAGNOSIS — M79.10 MYALGIA: ICD-10-CM

## 2020-12-15 PROBLEM — J06.9 UPPER RESPIRATORY TRACT INFECTION: Status: RESOLVED | Noted: 2020-12-14 | Resolved: 2020-12-15

## 2020-12-15 LAB — SARS-COV-2 RNA SPEC QL NAA+PROBE: DETECTED

## 2020-12-15 PROCEDURE — 99213 OFFICE O/P EST LOW 20 MIN: CPT | Performed by: FAMILY MEDICINE

## 2020-12-15 RX ORDER — CYCLOBENZAPRINE HCL 10 MG
10 TABLET ORAL 3 TIMES DAILY PRN
Qty: 60 TABLET | Refills: 1 | Status: SHIPPED | OUTPATIENT
Start: 2020-12-15

## 2020-12-16 ENCOUNTER — TELEPHONE (OUTPATIENT)
Dept: FAMILY MEDICINE CLINIC | Facility: CLINIC | Age: 35
End: 2020-12-16

## 2020-12-16 ENCOUNTER — TELEMEDICINE (OUTPATIENT)
Dept: FAMILY MEDICINE CLINIC | Facility: CLINIC | Age: 35
End: 2020-12-16
Payer: COMMERCIAL

## 2020-12-16 VITALS — TEMPERATURE: 98 F

## 2020-12-16 DIAGNOSIS — U07.1 COVID-19 VIRUS INFECTION: Primary | ICD-10-CM

## 2020-12-16 PROCEDURE — 99212 OFFICE O/P EST SF 10 MIN: CPT | Performed by: FAMILY MEDICINE

## 2020-12-16 RX ORDER — SODIUM CHLORIDE 9 MG/ML
20 INJECTION, SOLUTION INTRAVENOUS ONCE
Status: CANCELLED | OUTPATIENT
Start: 2020-12-18

## 2020-12-16 RX ORDER — ALBUTEROL SULFATE 90 UG/1
3 AEROSOL, METERED RESPIRATORY (INHALATION) ONCE AS NEEDED
Status: CANCELLED | OUTPATIENT
Start: 2020-12-18

## 2020-12-16 RX ORDER — ACETAMINOPHEN 325 MG/1
650 TABLET ORAL ONCE AS NEEDED
Status: CANCELLED | OUTPATIENT
Start: 2020-12-18

## 2020-12-16 RX ORDER — PREDNISONE 20 MG/1
20 TABLET ORAL 2 TIMES DAILY WITH MEALS
Qty: 10 TABLET | Refills: 0 | Status: SHIPPED | OUTPATIENT
Start: 2020-12-16

## 2020-12-17 ENCOUNTER — TELEMEDICINE (OUTPATIENT)
Dept: FAMILY MEDICINE CLINIC | Facility: CLINIC | Age: 35
End: 2020-12-17
Payer: COMMERCIAL

## 2020-12-17 VITALS — TEMPERATURE: 98 F

## 2020-12-17 DIAGNOSIS — U07.1 COVID-19 VIRUS INFECTION: Primary | ICD-10-CM

## 2020-12-17 PROCEDURE — 99212 OFFICE O/P EST SF 10 MIN: CPT | Performed by: FAMILY MEDICINE

## 2020-12-17 PROCEDURE — 1036F TOBACCO NON-USER: CPT | Performed by: FAMILY MEDICINE

## 2020-12-18 ENCOUNTER — HOSPITAL ENCOUNTER (OUTPATIENT)
Dept: INFUSION CENTER | Facility: HOSPITAL | Age: 35
Discharge: HOME | End: 2020-12-18
Payer: COMMERCIAL

## 2020-12-18 VITALS
HEART RATE: 71 BPM | DIASTOLIC BLOOD PRESSURE: 88 MMHG | TEMPERATURE: 97.7 F | SYSTOLIC BLOOD PRESSURE: 144 MMHG | RESPIRATION RATE: 18 BRPM | OXYGEN SATURATION: 99 %

## 2020-12-18 DIAGNOSIS — U07.1 COVID-19 VIRUS INFECTION: Primary | ICD-10-CM

## 2020-12-18 PROCEDURE — M0239 BAMLANIVIMAB-XXXX INFUSION: HCPCS | Performed by: FAMILY MEDICINE

## 2020-12-18 RX ORDER — ACETAMINOPHEN 325 MG/1
650 TABLET ORAL ONCE AS NEEDED
Status: DISCONTINUED | OUTPATIENT
Start: 2020-12-18 | End: 2020-12-21 | Stop reason: HOSPADM

## 2020-12-18 RX ORDER — SODIUM CHLORIDE 9 MG/ML
20 INJECTION, SOLUTION INTRAVENOUS ONCE
Status: COMPLETED | OUTPATIENT
Start: 2020-12-18 | End: 2020-12-18

## 2020-12-18 RX ORDER — ALBUTEROL SULFATE 90 UG/1
3 AEROSOL, METERED RESPIRATORY (INHALATION) ONCE AS NEEDED
Status: CANCELLED | OUTPATIENT
Start: 2020-12-18

## 2020-12-18 RX ORDER — ACETAMINOPHEN 325 MG/1
650 TABLET ORAL ONCE AS NEEDED
Status: CANCELLED | OUTPATIENT
Start: 2020-12-18

## 2020-12-18 RX ORDER — SODIUM CHLORIDE 9 MG/ML
20 INJECTION, SOLUTION INTRAVENOUS ONCE
Status: CANCELLED | OUTPATIENT
Start: 2020-12-18

## 2020-12-18 RX ORDER — ALBUTEROL SULFATE 90 UG/1
3 AEROSOL, METERED RESPIRATORY (INHALATION) ONCE AS NEEDED
Status: DISCONTINUED | OUTPATIENT
Start: 2020-12-18 | End: 2020-12-21 | Stop reason: HOSPADM

## 2020-12-18 RX ADMIN — SODIUM CHLORIDE 700 MG: 9 INJECTION, SOLUTION INTRAVENOUS at 09:50

## 2020-12-18 RX ADMIN — SODIUM CHLORIDE 20 ML/HR: 0.9 INJECTION, SOLUTION INTRAVENOUS at 09:35

## 2020-12-20 ENCOUNTER — TELEMEDICINE (OUTPATIENT)
Dept: FAMILY MEDICINE CLINIC | Facility: CLINIC | Age: 35
End: 2020-12-20
Payer: COMMERCIAL

## 2020-12-20 DIAGNOSIS — U07.1 COVID-19 VIRUS INFECTION: Primary | ICD-10-CM

## 2020-12-20 PROCEDURE — 99212 OFFICE O/P EST SF 10 MIN: CPT | Performed by: FAMILY MEDICINE

## 2020-12-22 ENCOUNTER — TELEMEDICINE (OUTPATIENT)
Dept: FAMILY MEDICINE CLINIC | Facility: CLINIC | Age: 35
End: 2020-12-22
Payer: COMMERCIAL

## 2020-12-22 DIAGNOSIS — U07.1 COVID-19 VIRUS INFECTION: Primary | ICD-10-CM

## 2020-12-22 PROCEDURE — 99213 OFFICE O/P EST LOW 20 MIN: CPT | Performed by: FAMILY MEDICINE

## 2020-12-24 ENCOUNTER — TELEMEDICINE (OUTPATIENT)
Dept: FAMILY MEDICINE CLINIC | Facility: CLINIC | Age: 35
End: 2020-12-24
Payer: COMMERCIAL

## 2020-12-24 DIAGNOSIS — U07.1 COVID-19 VIRUS INFECTION: Primary | ICD-10-CM

## 2020-12-24 PROCEDURE — 99213 OFFICE O/P EST LOW 20 MIN: CPT | Performed by: FAMILY MEDICINE

## 2020-12-24 PROCEDURE — 1036F TOBACCO NON-USER: CPT | Performed by: FAMILY MEDICINE

## 2021-02-09 ENCOUNTER — OFFICE VISIT (OUTPATIENT)
Dept: FAMILY MEDICINE CLINIC | Facility: CLINIC | Age: 36
End: 2021-02-09
Payer: COMMERCIAL

## 2021-02-09 VITALS
BODY MASS INDEX: 37.76 KG/M2 | WEIGHT: 294.13 LBS | HEART RATE: 76 BPM | DIASTOLIC BLOOD PRESSURE: 84 MMHG | SYSTOLIC BLOOD PRESSURE: 130 MMHG | TEMPERATURE: 97.8 F

## 2021-02-09 DIAGNOSIS — L84 CORN OF TOE: Primary | ICD-10-CM

## 2021-02-09 PROCEDURE — 99213 OFFICE O/P EST LOW 20 MIN: CPT | Performed by: NURSE PRACTITIONER

## 2021-02-09 PROCEDURE — 1036F TOBACCO NON-USER: CPT | Performed by: NURSE PRACTITIONER

## 2021-02-09 RX ORDER — NALTREXONE HYDROCHLORIDE 50 MG/1
TABLET, FILM COATED ORAL
COMMUNITY
Start: 2021-01-07

## 2021-02-09 NOTE — PROGRESS NOTES
Assessment and Plan:    Problem List Items Addressed This Visit        Musculoskeletal and Integument    Corn of toe - Primary     Patient advised to purchase over-the-counter corn questions to place in between the toes  Suspected corn and discomfort related to long work hours and stenting of the feet as well as the shoes he is using for work  Patient was given referral to Podiatry         Relevant Orders    Ambulatory referral to Podiatry                 Diagnoses and all orders for this visit:    [place] of toe  -     Ambulatory referral to Podiatry; Future    Other orders  -     naltrexone (REVIA) 50 mg tablet; TAKE A HALF TABLET BY MOUTH IN THE MORNING THEN 1 TABLET AT 3 PM              Subjective:      Patient ID: Alfred Fontaine is a 30 y o  male  CC:    Chief Complaint   Patient presents with    Toe Pain     area on r pinky toe       HPI:    Pt presents for discomfort on lateral 5th toe that has been present for about a year  States that area hurts especially when wearing shoes      The following portions of the patient's history were reviewed and updated as appropriate: allergies, current medications, past family history, past medical history, past social history, past surgical history and problem list       Review of Systems   Constitutional: Negative for chills, diaphoresis, fatigue and fever  HENT: Negative  Eyes: Negative for visual disturbance  Respiratory: Negative for chest tightness and shortness of breath  Cardiovascular: Negative for chest pain  Gastrointestinal: Negative for abdominal pain, diarrhea, nausea and vomiting  Genitourinary: Negative for difficulty urinating  Musculoskeletal: Negative for gait problem and joint swelling  Skin: Negative for rash and wound  Discomfort R fifth toe   Neurological: Negative for tremors, numbness and headaches  Psychiatric/Behavioral: Negative for agitation, confusion, dysphoric mood and suicidal ideas           Data to review: Objective:    Vitals:    02/09/21 1634   BP: 130/84   BP Location: Left arm   Patient Position: Sitting   Cuff Size: Large   Pulse: 76   Temp: 97 8 °F (36 6 °C)   TempSrc: Tympanic   Weight: 133 kg (294 lb 2 oz)        Physical Exam  Vitals signs and nursing note reviewed  Constitutional:       General: He is not in acute distress  Appearance: Normal appearance  He is well-developed  He is obese  He is not ill-appearing, toxic-appearing or diaphoretic  HENT:      Head: Normocephalic and atraumatic  Right Ear: External ear normal       Left Ear: External ear normal       Mouth/Throat:      Pharynx: Uvula midline  No oropharyngeal exudate  Eyes:      General: No scleral icterus  Pupils: Pupils are equal, round, and reactive to light  Cardiovascular:      Rate and Rhythm: Normal rate and regular rhythm  Heart sounds: Normal heart sounds  Pulmonary:      Effort: Pulmonary effort is normal  No respiratory distress  Breath sounds: Normal breath sounds  Musculoskeletal:      Right foot: Normal range of motion  No deformity or foot drop  Feet:    Feet:      Right foot:      Skin integrity: Callus present  Left foot:      Skin integrity: Skin integrity normal    Neurological:      Mental Status: He is alert and oriented to person, place, and time  Sensory: Sensation is intact  Motor: Motor function is intact  Coordination: Coordination is intact  Coordination normal       Gait: Gait normal       Deep Tendon Reflexes: Reflexes are normal and symmetric  Reflex Scores:       Tricep reflexes are 2+ on the right side and 2+ on the left side  Bicep reflexes are 2+ on the right side and 2+ on the left side  Patellar reflexes are 2+ on the right side and 2+ on the left side  Psychiatric:         Mood and Affect: Mood normal          Behavior: Behavior normal            BMI Counseling: Body mass index is 37 76 kg/m²   The BMI is above normal  Nutrition recommendations include decreasing portion sizes, moderation in carbohydrate intake, reducing intake of saturated and trans fat and reducing intake of cholesterol  Exercise recommendations include moderate physical activity 150 minutes/week and strength training exercises

## 2021-02-10 PROBLEM — L84 CORN OF TOE: Status: ACTIVE | Noted: 2021-02-10

## 2021-02-10 NOTE — ASSESSMENT & PLAN NOTE
Patient advised to purchase over-the-counter corn questions to place in between the toes  Suspected corn and discomfort related to long work hours and stenting of the feet as well as the shoes he is using for work    Patient was given referral to Podiatry

## 2021-09-08 ENCOUNTER — TELEMEDICINE (OUTPATIENT)
Dept: FAMILY MEDICINE CLINIC | Facility: CLINIC | Age: 36
End: 2021-09-08
Payer: COMMERCIAL

## 2021-09-08 DIAGNOSIS — B34.9 VIRAL INFECTION, UNSPECIFIED: Primary | ICD-10-CM

## 2021-09-08 PROCEDURE — 99213 OFFICE O/P EST LOW 20 MIN: CPT | Performed by: NURSE PRACTITIONER

## 2021-09-08 PROCEDURE — U0005 INFEC AGEN DETEC AMPLI PROBE: HCPCS | Performed by: NURSE PRACTITIONER

## 2021-09-08 PROCEDURE — U0003 INFECTIOUS AGENT DETECTION BY NUCLEIC ACID (DNA OR RNA); SEVERE ACUTE RESPIRATORY SYNDROME CORONAVIRUS 2 (SARS-COV-2) (CORONAVIRUS DISEASE [COVID-19]), AMPLIFIED PROBE TECHNIQUE, MAKING USE OF HIGH THROUGHPUT TECHNOLOGIES AS DESCRIBED BY CMS-2020-01-R: HCPCS | Performed by: NURSE PRACTITIONER

## 2021-09-08 NOTE — ASSESSMENT & PLAN NOTE
Despite covid vaccine which with timing he may not have had the two weeks to develop antibodies needed  Patient previous covid infection in December  Current symptoms with travel high suspicion for covid most likely variant  Send for testing today

## 2021-09-08 NOTE — LETTER
September 8, 2021     Patient: Alfredito Modi   YOB: 1985   Date of Visit: 9/8/2021       To Whom it May Concern:    Alfredito Modi is under my professional care  He was seen in my office on 9/8/2021  He may return to work on yet to be determined  patient being sent for covid testing and needs to isolate at home until cleared by our office  If you have any questions or concerns, please don't hesitate to call           Sincerely,          EDUARDO Cowart        CC: No Recipients

## 2021-09-08 NOTE — PROGRESS NOTES
COVID-19 Outpatient Progress Note    Assessment/Plan:    Problem List Items Addressed This Visit        Other    Viral infection, unspecified - Primary     Despite covid vaccine which with timing he may not have had the two weeks to develop antibodies needed  Patient previous covid infection in December  Current symptoms with travel high suspicion for covid most likely variant  Send for testing today  Relevant Orders    Novel Coronavirus (Covid-19),PCR [facility] - Collected at [facility] or [facility]         Disposition:     I referred patient to one of our centralized sites for a COVID-19 swab  I have spent 20 minutes directly with the patient  Greater than 50% of this time was spent in counseling/coordination of care regarding: instructions for management, patient and family education and impressions  Verification of patient location:    Patient is located in the following state in which I hold an active license [place]    Encounter provider Alena Pollard, [address] [place] [address]    Provider located at [address] [place] [address]  [address] [facility] [address] [place] [id number] [phone]    Recent Visits  No visits were found meeting these conditions  Showing recent visits within past 7 days and meeting all other requirements  Today's Visits  Date Type Provider Dept   09/08/21 Telemedicine EDUARDO Watkins P[facility]   Showing today's visits and meeting all other requirements  Future Appointments  No visits were found meeting these conditions  Showing future appointments within next 150 days and meeting all other requirements     This virtual check-in was done via [employer] and patient was informed that this is a secure, HIPAA-compliant platform  He agrees to proceed  Patient agrees to participate in a virtual check in via telephone or video visit instead of presenting to the office to address urgent/immediate medical needs  Patient is aware this is a billable service      After connecting through [facility], the patient was identified by name and date of birth  Ad Akbar was informed that this was a telemedicine visit and that the exam was being conducted confidentially over secure lines  My office door was closed  No one else was in the room  Ad Akbar acknowledged consent and understanding of privacy and security of the telemedicine visit  I informed the patient that I have reviewed his record in Epic and presented the opportunity for him to ask any questions regarding the visit today  The patient agreed to participate  Subjective:   Ad Akbar is a 30 y o  male who is concerned about COVID-19  Patient's symptoms include fever (possible in very early days), chills, fatigue, nasal congestion, rhinorrhea, sore throat (resolved now), anosmia (september 1st or 2nd), cough, shortness of breath (intermittently since august 30th), nausea, myalgias and headache  Patient denies malaise, loss of taste, chest tightness, abdominal pain, vomiting and diarrhea  Date of symptom onset: 8/30/2021  COVID-19 vaccination status: Fully vaccinated    Exposure:   Contact with a person who is under investigation (PUI) for or who is positive for COVID-19 within the last 14 days?: Yes    Hospitalized recently for fever and/or lower respiratory symptoms?: No      Currently a healthcare worker that is involved in direct patient care?: No      Works in a special setting where the risk of COVID-19 transmission may be high? (this may include long-term care, correctional and long-term facilities; homeless shelters; assisted-living facilities and group homes ): No      Resident in a special setting where the risk of COVID-19 transmission may be high? (this may include long-term care, correctional and long-term facilities; homeless shelters; assisted-living facilities and group homes ): No      Patient returned from [facility] on august 06ZC   Patient reports he took covid test in  prior to his arrival to the [address] [place] [address] [address] but not sure if it was PCR or rapid  He started with symptoms on august 30 or the 31st      Patient actually had covid in December  He received adebayo and adebayo vaccine on august 13-15th? Patient has been taking dayquil, nyquil    Patient reports he was exposed to someone sick but they tested negative so he has been out of work since returning back  Lab Results   Component Value Date    SARSCOV2 Detected (A) 12/14/2020     Past Medical History:   Diagnosis Date    ADHD     Anxiety     Depression     Psychiatric disorder     Bipolar     Past Surgical History:   Procedure Laterality Date    HERNIA REPAIR      MI REINSERT BI/TRICEPS TENDON,DISTAL Left 12/26/2017    Procedure: DISTAL BICEPS REPAIR;  Surgeon: Christiane Clayton MD;  [facility]: [facility];  Service: Orthopedics    TYMPANOSTOMY TUBE PLACEMENT      ear pressure equalization tube, insertion     Current Outpatient Medications   Medication Sig Dispense Refill    albuterol (PROVENTIL HFA,VENTOLIN HFA) 90 mcg/act inhaler Inhale 2 puffs every 6 (six) hours as needed      amphetamine-dextroamphetamine (ADDERALL XR) 30 MG 24 hr capsule Take 10 mg by mouth        budesonide-formoterol (SYMBICORT) 160-4 5 mcg/act inhaler INHALE 2 PUFFS BY MOUTH TWICE DAILY *RINSE MOUTH AFTER USE* 30 6 Inhaler 1    cloNIDine (CATAPRES) 0 1 mg tablet take 1/2 tablet by mouth AT 5:30PM AND 1/2 TABLET AT 9:30PM FOR 4   (REFER TO PRESCRIPTION NOTES)    0    gabapentin (NEURONTIN) 300 mg capsule TAKE 1 AT 5PM AND 3 AT BEDTIME  0    lamoTRIgine (LaMICtal) 25 mg tablet Take 50 mg by mouth 3 (three) times a day  0    naltrexone (REVIA) 50 mg tablet TAKE A HALF TABLET BY MOUTH IN THE MORNING THEN 1 TABLET AT 3 PM      cyclobenzaprine (FLEXERIL) 10 mg tablet Take 1 tablet (10 mg total) by mouth 3 (three) times a day as needed for muscle spasms (Patient not taking: Reported on 2/9/2021) 60 tablet 1    predniSONE 20 mg tablet Take 1 tablet (20 mg total) by mouth 2 (two) times a day with meals (Patient not taking: Reported on 2/9/2021) 10 tablet 0     No current facility-administered medications for this visit  Allergies   Allergen Reactions    Oxycodone-Acetaminophen GI Intolerance       Review of Systems   Constitutional: Positive for chills, fatigue and fever (possible in very early days)  HENT: Positive for congestion, hearing loss (partially in the left ear), rhinorrhea and sore throat (resolved now)  Negative for postnasal drip and sinus pressure  Respiratory: Positive for cough and shortness of breath (intermittently since august 30th)  Negative for chest tightness  Cardiovascular: Negative for chest pain  Gastrointestinal: Positive for nausea  Negative for abdominal pain, diarrhea and vomiting  Musculoskeletal: Positive for myalgias  Neurological: Positive for headaches  Objective: There were no vitals filed for this visit  Physical Exam  Nursing note reviewed  Constitutional:       General: He is not in acute distress  Appearance: Normal appearance  He is well-developed  He is not ill-appearing, toxic-appearing or diaphoretic  HENT:      Head: Normocephalic and atraumatic  Nose: Nose normal    Eyes:      Extraocular Movements: Extraocular movements intact  Conjunctiva/sclera: Conjunctivae normal    Pulmonary:      Effort: Pulmonary effort is normal  No respiratory distress  Musculoskeletal:         General: Normal range of motion  Skin:     Coloration: Skin is not pale  Findings: No erythema  Neurological:      Mental Status: He is alert and oriented to person, place, and time  Psychiatric:         Mood and Affect: Mood normal          Behavior: Behavior normal          Thought Content: Thought content normal          Judgment: Judgment normal          VIRTUAL VISIT DISCLAIMER    [facility] verbally agrees to participate in [employer]   Pt is aware that Virtual Care Services could be limited without vital signs or the ability to perform a full hands-on physical Rgoer Sims understands he or the provider may request at any time to terminate the video visit and request the patient to seek care or treatment in person

## 2021-09-15 ENCOUNTER — OFFICE VISIT (OUTPATIENT)
Dept: FAMILY MEDICINE CLINIC | Facility: CLINIC | Age: 36
End: 2021-09-15
Payer: COMMERCIAL

## 2021-09-15 VITALS
SYSTOLIC BLOOD PRESSURE: 112 MMHG | RESPIRATION RATE: 16 BRPM | WEIGHT: 312.8 LBS | HEIGHT: 74 IN | BODY MASS INDEX: 40.14 KG/M2 | DIASTOLIC BLOOD PRESSURE: 70 MMHG

## 2021-09-15 DIAGNOSIS — B34.9 VIRAL INFECTION, UNSPECIFIED: Primary | ICD-10-CM

## 2021-09-15 PROCEDURE — 99213 OFFICE O/P EST LOW 20 MIN: CPT | Performed by: NURSE PRACTITIONER

## 2021-09-15 PROCEDURE — 1036F TOBACCO NON-USER: CPT | Performed by: NURSE PRACTITIONER

## 2021-09-15 PROCEDURE — 3008F BODY MASS INDEX DOCD: CPT | Performed by: NURSE PRACTITIONER

## 2021-09-15 NOTE — ASSESSMENT & PLAN NOTE
Patient covid test negative and current uri symptoms have resolved  Patient cleared back to work  Provided copy of covid test result and letter

## 2021-09-15 NOTE — PROGRESS NOTES
Assessment and Plan:    Problem List Items Addressed This Visit        Other    Viral infection, unspecified - Primary     Patient covid test negative and current uri symptoms have resolved  Patient cleared back to work  Provided copy of covid test result and letter  Diagnoses and all orders for this visit:    Viral infection, unspecified              Subjective:      Patient ID: Kayla Martínez is a 30 y o  male  CC:    Chief Complaint   Patient presents with    Follow-up     pt here for a follow up and to be cleared to return back to work  [facility]       HPI:    Patient presents today to follow up on his   viral illness  He reports he has been out of work since he was seen 9/8 due to his symptoms  He had covid testing which was negative  Since then he states his symptoms have all resolved and he is feeling well  The following portions of the patient's history were reviewed and updated as appropriate: allergies, current medications, past family history, past medical history, past social history, past surgical history and problem list       Review of Systems   Constitutional: Negative for appetite change, chills, diaphoresis, fatigue and fever  HENT: Negative for congestion, ear pain, facial swelling, postnasal drip, rhinorrhea, sinus pressure, sinus pain, sore throat, trouble swallowing and voice change  Respiratory: Negative for cough, chest tightness, shortness of breath and wheezing  Cardiovascular: Negative for chest pain  Gastrointestinal: Negative for abdominal pain, diarrhea, nausea and vomiting  Musculoskeletal: Negative for myalgias  Neurological: Negative for headaches  Data to review:       Objective:    Vitals:    09/15/21 1411   BP: 112/70   BP Location: Left arm   Patient Position: Sitting   Cuff Size: Large   Resp: 16   Weight: (!) 142 kg (312 lb 12 8 oz)   Height: 6' 2" (1 88 m)        Physical Exam  Vitals and nursing note reviewed  Constitutional:       General: He is not in acute distress  Appearance: Normal appearance  He is obese  He is not ill-appearing, toxic-appearing or diaphoretic  HENT:      Head: Normocephalic and atraumatic  Right Ear: Tympanic membrane normal       Left Ear: Tympanic membrane normal       Nose: Nose normal       Mouth/Throat:      Mouth: Mucous membranes are moist    Eyes:      Extraocular Movements: Extraocular movements intact  Conjunctiva/sclera: Conjunctivae normal    Cardiovascular:      Rate and Rhythm: Normal rate  Heart sounds: Normal heart sounds, S1 normal and S2 normal    Pulmonary:      Effort: Pulmonary effort is normal       Breath sounds: Normal breath sounds  Neurological:      Mental Status: He is alert     Psychiatric:         Mood and Affect: Mood normal          Behavior: Behavior normal

## 2021-09-15 NOTE — LETTER
September 15, 2021     Patient: [facility]   YOB: 1985   Date of Visit: 9/15/2021       To Whom it May Concern:    [facility] is under my professional care  He was seen in my office on 9/15/2021  He may return to work on 9/16/2021  please excuse patient from 9/8/2021-9/15/2021       If you have any questions or concerns, please don't hesitate to call           Sincerely,          EDUARDO Mireles        CC: No Recipients

## 2021-10-26 ENCOUNTER — PATIENT MESSAGE (OUTPATIENT)
Dept: FAMILY MEDICINE CLINIC | Facility: CLINIC | Age: 36
End: 2021-10-26

## 2022-08-08 ENCOUNTER — TELEMEDICINE (OUTPATIENT)
Dept: FAMILY MEDICINE CLINIC | Facility: CLINIC | Age: 37
End: 2022-08-08
Payer: COMMERCIAL

## 2022-08-08 DIAGNOSIS — U07.1 COVID-19 VIRUS INFECTION: Primary | ICD-10-CM

## 2022-08-08 PROCEDURE — 99214 OFFICE O/P EST MOD 30 MIN: CPT | Performed by: NURSE PRACTITIONER

## 2022-08-08 NOTE — ASSESSMENT & PLAN NOTE
We discussed treatment  Patient not sure 100% when to narrow down symptom onset but he would like to take paxlovid  He is vaccinated with j&J  Follow up tomorrow  Tylenol ibuprofen as needed  Robitussin or delsym for cough  Ocean nasal spray for congestion

## 2022-08-08 NOTE — LETTER
August 8, 2022     Patient: Candice Mario  YOB: 1985  Date of Visit: 8/8/2022      To Whom it May Concern:    Candice Mario is under my professional care  Malgorzata Stallings was seen in my office on 8/8/2022  Malgorzata Stallings may return to work on 8/10/2022  If you have any questions or concerns, please don't hesitate to call           Sincerely,          EDUARDO Osborne        CC: No Recipients

## 2022-08-08 NOTE — PROGRESS NOTES
COVID-19 Outpatient Progress Note    Assessment/Plan:    Problem List Items Addressed This Visit        Other    COVID-19 virus infection - Primary     We discussed treatment  Patient not sure 100% when to narrow down symptom onset but he would like to take paxlovid  He is vaccinated with j&J  Follow up tomorrow  Tylenol ibuprofen as needed  Robitussin or delsym for cough  Ocean nasal spray for congestion  Relevant Medications    nirmatrelvir & ritonavir (Paxlovid) tablet therapy pack         Disposition:     Patient has COVID-19 infection  Based off CDC guidelines, they were recommended to isolate for 5 days from the date of the positive test  If they remain asymptomatic, isolation may be ended followed by 5 days of wearing a mask when around othes to minimize risk of infecting others  If they have a fever, continue to stay home until fever resolves for at least 24 hours  Discussed symptom directed medication options with patient  Discussed vitamin D, vitamin C, and/or zinc supplementation with patient  Patient meets criteria for PAXLOVID and they have been counseled appropriately according to EUA documentation released by the FDA  After discussion, patient agrees to treatment  Cayetano Martinez is an investigational medicine used to treat mild-to-moderate COVID-19 in adults and children (15years of age and older weighing at least 80 pounds (40 kg)) with positive results of direct SARS-CoV-2 viral testing, and who are at high risk for progression to severe COVID-19, including hospitalization or death  PAXLOVID is investigational because it is still being studied  There is limited information about the safety and effectiveness of using PAXLOVID to treat people with mild-to-moderate COVID-19      The FDA has authorized the emergency use of PAXLOVID for the treatment of mild-tomoderate COVID-19 in adults and children (15years of age and older weighing at least 80 pounds (40 kg)) with a positive test for the virus that causes COVID-19, and who are at high risk for progression to severe COVID-19, including hospitalization or death, under an EUA  What should I tell my healthcare provider before I take PAXLOVID? Tell your healthcare provider if you:  - Have any allergies  - Have liver or kidney disease  - Are pregnant or plan to become pregnant  - Are breastfeeding a child  - Have any serious illnesses    Tell your healthcare provider about all the medicines you take, including prescription and over-the-counter medicines, vitamins, and herbal supplements  Some medicines may interact with PAXLOVID and may cause serious side effects  Keep a list of your medicines to show your healthcare provider and pharmacist when you get a new medicine  You can ask your healthcare provider or pharmacist for a list of medicines that interact with PAXLOVID  Do not start taking a new medicine without telling your healthcare provider  Your healthcare provider can tell you if it is safe to take PAXLOVID with other medicines  Tell your healthcare provider if you are taking combined hormonal contraceptive  PAXLOVID may affect how your birth control pills work  Females who are able to become pregnant should use another effective alternative form of contraception or an additional barrier method of contraception  Talk to your healthcare provider if you have any questions about contraceptive methods that might be right for you  How do I take PAXLOVID? PAXLOVID consists of 2 medicines: nirmatrelvir and ritonavir  - Take 2 pink tablets of nirmatrelvir with 1 white tablet of ritonavir by mouth 2 times each day (in the morning and in the evening) for 5 days  For each dose, take all 3 tablets at the same time  - If you have kidney disease, talk to your healthcare provider  You may need a different dose  - Swallow the tablets whole  Do not chew, break, or crush the tablets  - Take PAXLOVID with or without food    - Do not stop taking PAXLOVID without talking to your healthcare provider, even if you feel better  - If you miss a dose of PAXLOVID within 8 hours of the time it is usually taken, take it as soon as you remember  If you miss a dose by more than 8 hours, skip the missed dose and take the next dose at your regular time  Do not take 2 doses of PAXLOVID at the same time  - If you take too much PAXLOVID, call your healthcare provider or go to the nearest hospital emergency room right away  - If you are taking a ritonavir- or cobicistat-containing medicine to treat hepatitis C or Human Immunodeficiency Virus (HIV), you should continue to take your medicine as prescribed by your healthcare provider   - Talk to your healthcare provider if you do not feel better or if you feel worse after 5 days  Who should generally not take PAXLOVID? Do not take PAXLOVID if:  You are allergic to nirmatrelvir, ritonavir, or any of the ingredients in PAXLOVID  You are taking any of the following medicines:  - Alfuzosin  - Pethidine, piroxicam, propoxyphene  - Ranolazine  - Amiodarone, dronedarone, flecainide, propafenone, quinidine  - Colchicine  - Lurasidone, pimozide, clozapine  - Dihydroergotamine, ergotamine, methylergonovine  - Lovastatin, simvastatin  - Sildenafil (Revatio®) for pulmonary arterial hypertension (PAH)  - Triazolam, oral midazolam  - Apalutamide  - Carbamazepine, phenobarbital, phenytoin  - Rifampin  - St  Crs Wort (hypericum perforatum)    What are the important possible side effects of PAXLOVID? Possible side effects of PAXLOVID are:  - Liver Problems  Tell your healthcare provider right away if you have any of these signs and symptoms of liver problems: loss of appetite, yellowing of your skin and the whites of eyes (jaundice), dark-colored urine, pale colored stools and itchy skin, stomach area (abdominal) pain  - Resistance to HIV Medicines   If you have untreated HIV infection, PAXLOVID may lead to some HIV medicines not working as well in the future  - Other possible side effects include: altered sense of taste, diarrhea, high blood pressure, or muscle aches    These are not all the possible side effects of PAXLOVID  Not many people have taken PAXLOVID  Serious and unexpected side effects may happen  Chitra Lindsay is still being studied, so it is possible that all of the risks are not known at this time  What other treatment choices are there? Like Veronica Many may allow for the emergency use of other medicines to treat people with COVID-19  Go to https://modu/ for information on the emergency use of other medicines that are authorized by FDA to treat people with COVID-19  Your healthcare provider may talk with you about clinical trials for which you may be eligible  It is your choice to be treated or not to be treated with PAXLOVID  Should you decide not to receive it or for your child not to receive it, it will not change your standard medical care  What if I am pregnant or breastfeeding? There is no experience treating pregnant women or breastfeeding mothers with PAXLOVID  For a mother and unborn baby, the benefit of taking PAXLOVID may be greater than the risk from the treatment  If you are pregnant, discuss your options and specific situation with your healthcare provider  It is recommended that you use effective barrier contraception or do not have sexual activity while taking PAXLOVID  If you are breastfeeding, discuss your options and specific situation with your healthcare provider  How do I report side effects with PAXLOVID? Contact your healthcare provider if you have any side effects that bother you or do not go away      Report side effects to FDA MedWatch at www fda gov/medwatch or call 8-818-GGX2943 or you can report side effects to [facility]  at the contact information provided below  Website Fax number Telephone number   [employer] [phone]     How should I store Manju Hawkins? Store PAXLOVID tablets at room temperature between 68°F to 77°F (20°C to 25°C)  Full fact sheet for patients, parents, and caregivers can be found at: [employer]    I have spent 13 minutes directly with the patient  Greater than 50% of this time was spent in counseling/coordination of care regarding: prognosis, risks and benefits of treatment options, instructions for management, patient and family education and impressions  Encounter provider EDUARDO Zimmerman    Provider located at [address]  [address] [facility] [address] [place] [id number] [phone]    Recent Visits  No visits were found meeting these conditions  Showing recent visits within past 7 days and meeting all other requirements  Today's Visits  Date Type Provider Dept   08/08/22 Telemedicine EDUARDO Obando Pg [facility]   Showing today's visits and meeting all other requirements  Future Appointments  No visits were found meeting these conditions  Showing future appointments within next 150 days and meeting all other requirements     This virtual check-in was done via [employer] and patient was informed that this is a secure, HIPAA-compliant platform  He agrees to proceed  Patient agrees to participate in a virtual check in via telephone or video visit instead of presenting to the office to address urgent/immediate medical needs  Patient is aware this is a billable service  After connecting through [facility], the patient was identified by name and date of birth  Gisselle Vora was informed that this was a telemedicine visit and that the exam was being conducted confidentially over secure lines  My office door was closed  No one else was in the room   Gisselle Vora acknowledged consent and understanding of privacy and security of the telemedicine visit  I informed the patient that I have reviewed his record in Epic and presented the opportunity for him to ask any questions regarding the visit today  The patient agreed to participate  Verification of patient location:  Patient is located in the following state in which I hold an active license: [place]    Subjective:   Renee Lizama is a 29 y o  male who has been screened for COVID-19  Symptom change since last report: unchanged  Patient's symptoms include nasal congestion, cough and headache  Patient denies fever, chills, fatigue, malaise, rhinorrhea, sore throat, anosmia, loss of taste, shortness of breath, chest tightness, abdominal pain, nausea, vomiting, diarrhea and myalgias  - Date of symptom onset: 8/5/2022  - Date of exposure: 8/4/2022  - Date of positive COVID-19 test: 8/5/2022  Type of test: PCR  COVID-19 vaccination status: Fully vaccinated (primary series)    Adelia Viveros has been staying home and has isolated themselves in his home  Family members tested positive while they were on vacation in [address] [place] [address]       Lab Results   Component Value Date    SARSCOV2 Negative 09/08/2021    SARSCOV2 Detected (A) 12/14/2020     Past Medical History:   Diagnosis Date    ADHD     Anxiety     Depression     Psychiatric disorder     Bipolar     Past Surgical History:   Procedure Laterality Date    HERNIA REPAIR      MI REINSERT BI/TRICEPS TENDON,DISTAL Left 12/26/2017    Procedure: DISTAL BICEPS REPAIR;  Surgeon: Emory Thomas MD;  [facility];  Service: Orthopedics    TYMPANOSTOMY TUBE PLACEMENT      ear pressure equalization tube, insertion     Current Outpatient Medications   Medication Sig Dispense Refill    nirmatrelvir & ritonavir (Paxlovid) tablet therapy pack Take 3 tablets by mouth 2 (two) times a day for 5 days Take 2 nirmatrelvir tablets + 1 ritonavir tablet together per dose 30 tablet 0    albuterol (PROVENTIL HFA,VENTOLIN HFA) 90 mcg/act inhaler Inhale 2 puffs every 6 (six) hours as needed      amphetamine-dextroamphetamine (ADDERALL XR) 30 MG 24 hr capsule Take 10 mg by mouth        budesonide-formoterol (SYMBICORT) 160-4 5 mcg/act inhaler INHALE 2 PUFFS BY MOUTH TWICE DAILY *RINSE MOUTH AFTER USE* 30 6 Inhaler 1    cloNIDine (CATAPRES) 0 1 mg tablet take 1/2 tablet by mouth AT 5:30PM AND 1/2 TABLET AT 9:30PM FOR 4   (REFER TO PRESCRIPTION NOTES)  0    cyclobenzaprine (FLEXERIL) 10 mg tablet Take 1 tablet (10 mg total) by mouth 3 (three) times a day as needed for muscle spasms (Patient not taking: Reported on 2/9/2021) 60 tablet 1    gabapentin (NEURONTIN) 300 mg capsule TAKE 1 AT 5PM AND 3 AT BEDTIME  0    lamoTRIgine (LaMICtal) 25 mg tablet Take 50 mg by mouth 3 (three) times a day  0    naltrexone (REVIA) 50 mg tablet TAKE A HALF TABLET BY MOUTH IN THE MORNING THEN 1 TABLET AT 3 PM      predniSONE 20 mg tablet Take 1 tablet (20 mg total) by mouth 2 (two) times a day with meals (Patient not taking: Reported on 9/15/2021) 10 tablet 0     No current facility-administered medications for this visit  Allergies   Allergen Reactions    Oxycodone-Acetaminophen GI Intolerance       Review of Systems   Constitutional: Negative for chills, fatigue and fever  HENT: Positive for congestion  Negative for rhinorrhea and sore throat  Respiratory: Positive for cough  Negative for chest tightness and shortness of breath  Gastrointestinal: Negative for abdominal pain, diarrhea, nausea and vomiting  Musculoskeletal: Negative for myalgias  Neurological: Positive for headaches  Objective: There were no vitals filed for this visit  Physical Exam  Nursing note reviewed  Constitutional:       General: He is not in acute distress  Appearance: Normal appearance  He is well-developed  He is not ill-appearing, toxic-appearing or diaphoretic  HENT:      Head: Normocephalic and atraumatic        Nose: Nose normal    Eyes:      Extraocular Movements: Extraocular movements intact  Conjunctiva/sclera: Conjunctivae normal    Pulmonary:      Effort: Pulmonary effort is normal  No respiratory distress  Musculoskeletal:         General: Normal range of motion  Skin:     Coloration: Skin is not pale  Findings: No erythema  Neurological:      Mental Status: He is alert and oriented to person, place, and time  Psychiatric:         Mood and Affect: Mood normal          Behavior: Behavior normal          Thought Content: Thought content normal          VIRTUAL VISIT DISCLAIMER    Celine Lazarus verbally agrees to participate in [employer]  Pt is aware that [employer] could be limited without vital signs or the ability to perform a full hands-on physical Twylla Saliva understands he or the provider may request at any time to terminate the video visit and request the patient to seek care or treatment in person

## 2022-08-10 ENCOUNTER — TELEMEDICINE (OUTPATIENT)
Dept: FAMILY MEDICINE CLINIC | Facility: CLINIC | Age: 37
End: 2022-08-10
Payer: COMMERCIAL

## 2022-08-10 VITALS — BODY MASS INDEX: 40.04 KG/M2 | HEIGHT: 74 IN | WEIGHT: 312 LBS

## 2022-08-10 DIAGNOSIS — U07.1 COVID-19 VIRUS INFECTION: Primary | ICD-10-CM

## 2022-08-10 PROCEDURE — 99213 OFFICE O/P EST LOW 20 MIN: CPT | Performed by: NURSE PRACTITIONER

## 2022-08-10 NOTE — PROGRESS NOTES
COVID-19 Outpatient Progress Note    Assessment/Plan:    Problem List Items Addressed This Visit        Other    COVID-19 virus infection - Primary     Patient never picked up prescription for paxlovid but is completely symptoms free  No need to start and also pt outside of treatment window  Follow up as needed if symptoms change or worsen  Disposition:     Patient has COVID-19 infection  Based off CDC guidelines, they were recommended to isolate for 5 days from the date of the positive test  If they remain asymptomatic, isolation may be ended followed by 5 days of wearing a mask when around othes to minimize risk of infecting others  If they have a fever, continue to stay home until fever resolves for at least 24 hours  I have spent 5 minutes directly with the patient  Greater than 50% of this time was spent in counseling/coordination of care regarding: risks and benefits of treatment options, instructions for management and patient and family education  Encounter provider EDUARDO Khan    Provider located at [address] [address] [phone]    Recent Visits  Date Type Provider Dept   08/08/22 Telemedicine EDUARDO Price Pg [facility]   Showing recent visits within past 7 days and meeting all other requirements  Today's Visits  Date Type Provider Dept   08/10/22 Telemedicine EDUARDO Price Pg [facility]   Showing today's visits and meeting all other requirements  Future Appointments  No visits were found meeting these conditions  Showing future appointments within next 150 days and meeting all other requirements       The patient was identified by name and date of birth  Renee Lizama was informed that this is a telemedicine visit and that the visit is being conducted through [address] [place] [address] Now and patient was informed that this is a secure, HIPAA-compliant platform   He agrees to proceed     My office door was closed  No one else was in the room  He acknowledged consent and understanding of privacy and security of the video platform  The patient has agreed to participate and understands they can discontinue the visit at any time  Patient is aware this is a billable service  This virtual check-in was done via [employer] and patient was informed that this is a secure, HIPAA-compliant platform  He agrees to proceed  Patient agrees to participate in a virtual check in via telephone or video visit instead of presenting to the office to address urgent/immediate medical needs  Patient is aware this is a billable service  After connecting through [facility], the patient was identified by name and date of birth  Leisa Hedrick was informed that this was a telemedicine visit and that the exam was being conducted confidentially over secure lines  My office door was closed  No one else was in the room  Leisa Hedrick acknowledged consent and understanding of privacy and security of the telemedicine visit  I informed the patient that I have reviewed his record in Epic and presented the opportunity for him to ask any questions regarding the visit today  The patient agreed to participate  Verification of patient location:  Patient is located in the following state in which I hold an active license: [place]    Subjective:   Leisa Hedrick is a 29 y o  male who has been screened for COVID-19  Symptom change since last report: resolving  Patient's symptoms include cough  Patient denies fever, chills, fatigue, malaise, congestion, rhinorrhea, sore throat, anosmia, loss of taste, shortness of breath, chest tightness, abdominal pain, nausea, vomiting, diarrhea, myalgias and headaches  - Date of symptom onset: 8/5/2022  - Date of exposure: 8/4/2022      COVID-19 vaccination status: Fully vaccinated (primary series)    Ying Patino has been staying home and has isolated themselves in his home       Pt didn't  prescription for paxlovid     Lab Results   Component Value Date    SARSCOV2 Negative 09/08/2021    SARSCOV2 Detected (A) 12/14/2020     Past Medical History:   Diagnosis Date    ADHD     Anxiety     Depression     Psychiatric disorder     Bipolar     Past Surgical History:   Procedure Laterality Date    HERNIA REPAIR      AK REINSERT BI/TRICEPS TENDON,DISTAL Left 12/26/2017    Procedure: DISTAL BICEPS REPAIR;  Surgeon: Brigida Whitaker MD;  [facility];  Service: Orthopedics    TYMPANOSTOMY TUBE PLACEMENT      ear pressure equalization tube, insertion     Current Outpatient Medications   Medication Sig Dispense Refill    ADDERALL XR, 20MG, 20 MG 24 hr capsule TAKE 1 CAPSULE BY MOUTH AT NOON      albuterol (PROVENTIL HFA,VENTOLIN HFA) 90 mcg/act inhaler Inhale 2 puffs every 6 (six) hours as needed      amphetamine-dextroamphetamine (ADDERALL XR) 30 MG 24 hr capsule Take 10 mg by mouth        budesonide-formoterol (SYMBICORT) 160-4 5 mcg/act inhaler INHALE 2 PUFFS BY MOUTH TWICE DAILY *RINSE MOUTH AFTER USE* 30 6 Inhaler 1    cloNIDine (CATAPRES) 0 1 mg tablet take 1/2 tablet by mouth AT 5:30PM AND 1/2 TABLET AT 9:30PM FOR 4   (REFER TO PRESCRIPTION NOTES)    0    gabapentin (NEURONTIN) 300 mg capsule TAKE 1 AT 5PM AND 3 AT BEDTIME  0    lamoTRIgine (LaMICtal) 100 mg tablet Take 100 mg by mouth 2 (two) times a day      lamoTRIgine (LaMICtal) 25 mg tablet Take 50 mg by mouth 3 (three) times a day  0    naltrexone (REVIA) 50 mg tablet TAKE A HALF TABLET BY MOUTH IN THE MORNING THEN 1 TABLET AT 3 PM      cyclobenzaprine (FLEXERIL) 10 mg tablet Take 1 tablet (10 mg total) by mouth 3 (three) times a day as needed for muscle spasms (Patient not taking: No sig reported) 60 tablet 1    predniSONE 20 mg tablet Take 1 tablet (20 mg total) by mouth 2 (two) times a day with meals (Patient not taking: No sig reported) 10 tablet 0     No current facility-administered medications for this visit  Allergies   Allergen Reactions    Oxycodone-Acetaminophen GI Intolerance       Review of Systems   Constitutional: Negative for appetite change, chills, diaphoresis, fatigue and fever  HENT: Negative for congestion, ear pain, facial swelling, postnasal drip, rhinorrhea, sinus pressure, sinus pain, sore throat, trouble swallowing and voice change  Respiratory: Positive for cough  Negative for chest tightness, shortness of breath and wheezing  Cardiovascular: Negative for chest pain  Gastrointestinal: Negative for abdominal pain, diarrhea, nausea and vomiting  Musculoskeletal: Negative for myalgias  Neurological: Negative for headaches  Objective:    Vitals:    08/10/22 1159   Weight: (!) 142 kg (312 lb)   Height: 6' 2" (1 88 m)       Physical Exam  Nursing note reviewed  Constitutional:       General: He is not in acute distress  Appearance: Normal appearance  He is well-developed  He is not ill-appearing, toxic-appearing or diaphoretic  HENT:      Head: Normocephalic and atraumatic  Nose: Nose normal    Eyes:      Extraocular Movements: Extraocular movements intact  Conjunctiva/sclera: Conjunctivae normal    Pulmonary:      Effort: Pulmonary effort is normal  No respiratory distress  Musculoskeletal:         General: Normal range of motion  Skin:     Coloration: Skin is not pale  Findings: No erythema  Neurological:      Mental Status: He is alert and oriented to person, place, and time  Psychiatric:         Mood and Affect: Mood normal          Behavior: Behavior normal          Thought Content: Thought content normal          Judgment: Judgment normal      BMI Counseling: Body mass index is 40 06 kg/m²  The BMI is above normal  Nutrition recommendations include decreasing portion sizes, decreasing fast food intake and increasing intake of lean protein  Rationale for BMI follow-up plan is due to patient being overweight or obese

## 2022-10-12 PROBLEM — R50.9 FEVER: Status: RESOLVED | Noted: 2020-12-14 | Resolved: 2022-10-12

## 2022-12-29 ENCOUNTER — OFFICE VISIT (OUTPATIENT)
Dept: URGENT CARE | Age: 37
End: 2022-12-29

## 2022-12-29 VITALS
BODY MASS INDEX: 40.04 KG/M2 | RESPIRATION RATE: 18 BRPM | HEIGHT: 74 IN | TEMPERATURE: 98.1 F | OXYGEN SATURATION: 99 % | HEART RATE: 82 BPM | WEIGHT: 312 LBS

## 2022-12-29 DIAGNOSIS — F41.9 ANXIETY: ICD-10-CM

## 2022-12-29 DIAGNOSIS — R04.0 NOSEBLEED: Primary | ICD-10-CM

## 2022-12-29 RX ORDER — DEXTROAMPHETAMINE SULFATE, DEXTROAMPHETAMINE SACCHARATE, AMPHETAMINE SULFATE AND AMPHETAMINE ASPARTATE 6.25; 6.25; 6.25; 6.25 MG/1; MG/1; MG/1; MG/1
25 CAPSULE, EXTENDED RELEASE ORAL 2 TIMES DAILY
COMMUNITY
Start: 2022-12-08

## 2022-12-29 RX ORDER — FLUTICASONE PROPIONATE 50 MCG
1 SPRAY, SUSPENSION (ML) NASAL DAILY
Qty: 11.1 ML | Refills: 0 | Status: SHIPPED | OUTPATIENT
Start: 2022-12-29

## 2022-12-29 NOTE — PROGRESS NOTES
[address] [facility]        NAME: Javan Tracy is a 40 y o  male  : 1985    MRN: [id number]  DATE: 2022  TIME: 10:55 AM    Assessment and Plan   Nosebleed [R04 0]  1  Nosebleed  Ambulatory Referral to Otolaryngology    fluticasone (FLONASE) 50 mcg/act nasal spray      2  Anxiety          Patient presents with concern over recurrent nosebleeds and increasing anxiety and stress  States had an issue at work yesterday and was sent home  Has a psychiatrist and is on medication for borderline personality disorder  Denies HI/SI   Believes his nosebleeds are triggered by stress and was told he had high blood pressure at one time  Assessment notes congestion , enlarged turbinates  Discussed Flonase and humidification  At this time , patient appears anxious however not agitated  He states he is aware when he needs to step back from a situation  Discussed continuing medications as ordered by psychiatrist and immediately reaching out since he states symptoms are increasing  Patient agreeable  Patient Instructions       Follow up with PCP and psych ASAP  Crisis intervention for HI/SI  Proceed to  ER if symptoms worsen  Chief Complaint     Chief Complaint   Patient presents with   • Anxiety     Pt had an episode yesterday after a situation at work  PT also has nose bleeds          History of Present Illness       Patient presents with concern over recurrent nosebleeds and increasing anxiety and stress  States had an issue at work yesterday and was sent home  Has a psychiatrist and is on medication for borderline personality disorder  Denies HI/SI   Believes his nosebleeds are triggered by stress and was told he had high blood pressure at one time  Assessment notes congestion , enlarged turbinates  Discussed Flonase and humidification  At this time , patient appears anxious however not agitated  He states he is aware when he needs to step back from a situation   Discussed continuing medications as ordered by psychiatrist and immediately reaching out since he states symptoms are increasing  Patient agreeable  Review of Systems   Review of Systems   Constitutional: Negative for chills and fever  HENT: Positive for congestion and nosebleeds  Negative for ear pain, postnasal drip, sinus pain and sore throat  Eyes: Negative for pain and itching  Respiratory: Negative for cough, shortness of breath and wheezing  Cardiovascular: Negative for chest pain and palpitations  Gastrointestinal: Negative for abdominal pain, constipation, diarrhea, nausea and vomiting  Genitourinary: Negative for difficulty urinating and hematuria  Musculoskeletal: Negative for arthralgias and myalgias  Skin: Negative for rash  Neurological: Negative for dizziness, light-headedness and headaches  Psychiatric/Behavioral: Negative for agitation and sleep disturbance  The patient is nervous/anxious  Current Medications       Current Outpatient Medications:   •  ADDERALL XR, 25MG, 25 MG 24 hr capsule, Take 25 mg by mouth 2 (two) times a day, Disp: , Rfl:   •  albuterol (PROVENTIL HFA,VENTOLIN HFA) 90 mcg/act inhaler, Inhale 2 puffs every 6 (six) hours as needed, Disp: , Rfl:   •  budesonide-formoterol (SYMBICORT) 160-4 5 mcg/act inhaler, INHALE 2 PUFFS BY MOUTH TWICE DAILY *RINSE MOUTH AFTER USE*, Disp: 30 6 Inhaler, Rfl: 1  •  cloNIDine (CATAPRES) 0 1 mg tablet, take 1/2 tablet by mouth AT 5:30PM AND 1/2 TABLET AT 9:30PM FOR 4   (REFER TO PRESCRIPTION NOTES)  , Disp: , Rfl: 0  •  fluticasone (FLONASE) 50 mcg/act nasal spray, 1 spray into each nostril daily, Disp: 11 1 mL, Rfl: 0  •  lamoTRIgine (LaMICtal) 100 mg tablet, Take 100 mg by mouth 2 (two) times a day, Disp: , Rfl:   •  naltrexone (REVIA) 50 mg tablet, TAKE A HALF TABLET BY MOUTH IN THE MORNING THEN 1 TABLET AT 3 PM, Disp: , Rfl:   •  ADDERALL XR, 20MG, 20 MG 24 hr capsule, TAKE 1 CAPSULE BY MOUTH AT NOON (Patient not taking: Reported on 12/29/2022), Disp: , Rfl:   •  amphetamine-dextroamphetamine (ADDERALL XR) 30 MG 24 hr capsule, Take 10 mg by mouth   (Patient not taking: Reported on 12/29/2022), Disp: , Rfl:   •  cyclobenzaprine (FLEXERIL) 10 mg tablet, Take 1 tablet (10 mg total) by mouth 3 (three) times a day as needed for muscle spasms (Patient not taking: No sig reported), Disp: 60 tablet, Rfl: 1  •  gabapentin (NEURONTIN) 300 mg capsule, TAKE 1 AT 5PM AND 3 AT BEDTIME (Patient not taking: Reported on 12/29/2022), Disp: , Rfl: 0  •  lamoTRIgine (LaMICtal) 25 mg tablet, Take 50 mg by mouth 3 (three) times a day, Disp: , Rfl: 0  •  predniSONE 20 mg tablet, Take 1 tablet (20 mg total) by mouth 2 (two) times a day with meals (Patient not taking: No sig reported), Disp: 10 tablet, Rfl: 0    Current Allergies     Allergies as of 12/29/2022 - Reviewed 12/29/2022   Allergen Reaction Noted   • Oxycodone-acetaminophen GI Intolerance 02/27/2012            The following portions of the patient's history were reviewed and updated as appropriate: allergies, current medications, past family history, past medical history, past social history, past surgical history and problem list      Past Medical History:   Diagnosis Date   • ADHD    • Anxiety    • Depression    • Psychiatric disorder     Bipolar       Past Surgical History:   Procedure Laterality Date   • HERNIA REPAIR     • [place] RINSJ RPTD BICEPS/TRICEPS TDN DSTL W/WO TDN GRF Left 12/26/2017    Procedure: DISTAL BICEPS REPAIR;  Surgeon: Turner Betancur MD;  Location: [facility];  Service: Orthopedics   • TYMPANOSTOMY TUBE PLACEMENT      ear pressure equalization tube, insertion       Family History   Problem Relation Age of Onset   • Diabetes Maternal Grandmother    • Heart disease Maternal Grandmother    • No Known Problems Father          Medications have been verified          Objective   Pulse 82   Temp 98 1 °F (36 7 °C)   Resp 18   Ht 6' 2" (1 88 m)   Wt (!) 142 kg (312 lb) SpO2 99%   BMI 40 06 kg/m²   No LMP for male patient  Physical Exam     Physical Exam  Vitals reviewed  Constitutional:       General: He is not in acute distress  Appearance: Normal appearance  He is not ill-appearing  HENT:      Head: Normocephalic and atraumatic  Nose: Congestion present  Right Turbinates: Enlarged  Left Turbinates: Enlarged  Eyes:      Extraocular Movements: Extraocular movements intact  Conjunctiva/sclera: Conjunctivae normal    Pulmonary:      Effort: Pulmonary effort is normal    Skin:     General: Skin is warm  Neurological:      General: No focal deficit present  Mental Status: He is alert  Psychiatric:         Mood and Affect: Mood is anxious  Affect is not angry  Speech: Speech normal          Behavior: Behavior normal  Behavior is not aggressive  Behavior is cooperative  Thought Content: Thought content does not include homicidal or suicidal plan           Judgment: Judgment normal

## 2022-12-29 NOTE — LETTER
December 29, 2022     Patient: Freddrick Severin   YOB: 1985   Date of Visit: 12/29/2022       To Whom it May Concern:    Freddrick Severin was seen in my clinic on 12/29/2022  He may return to work on 12/30/2022  If you have any questions or concerns, please don't hesitate to call           Sincerely,          Marcelo Monroe, EDUARDO        CC: No Recipients

## (undated) DEVICE — GAUZE SPONGES,16 PLY: Brand: CURITY

## (undated) DEVICE — ACE WRAP 4 IN UNSTERILE

## (undated) DEVICE — SUT VICRYL PLUS 0 CTB-1 27 IN VCPB260H

## (undated) DEVICE — SPONGE PVP SCRUB WING STERILE

## (undated) DEVICE — BULB SYRINGE,IRRIGATION WITH PROTECTIVE CAP: Brand: DOVER

## (undated) DEVICE — GLOVE INDICATOR PI UNDERGLOVE SZ 7.5 BLUE

## (undated) DEVICE — CHLORAPREP HI-LITE 26ML ORANGE

## (undated) DEVICE — 3M™ STERI-STRIP™ REINFORCED ADHESIVE SKIN CLOSURES, R1547, 1/2 IN X 4 IN (12 MM X 100 MM), 6 STRIPS/ENVELOPE: Brand: 3M™ STERI-STRIP™

## (undated) DEVICE — PADDING CAST 4 IN  COTTON STRL

## (undated) DEVICE — UNIVERSAL MAJOR EXTREMITY,KIT: Brand: CARDINAL HEALTH

## (undated) DEVICE — INTENDED FOR TISSUE SEPARATION, AND OTHER PROCEDURES THAT REQUIRE A SHARP SURGICAL BLADE TO PUNCTURE OR CUT.: Brand: BARD-PARKER SAFETY BLADES SIZE 15, STERILE

## (undated) DEVICE — OCCLUSIVE GAUZE STRIP,3% BISMUTH TRIBROMOPHENATE IN PETROLATUM BLEND: Brand: XEROFORM

## (undated) DEVICE — GLOVE SRG BIOGEL 7.5

## (undated) DEVICE — SUT MONOCRYL 4-0 PS-2 27 IN Y426H

## (undated) DEVICE — ACE WRAP 6 IN UNSTERILE

## (undated) DEVICE — BANDAGE, ESMARK LF STR 4"X9'(20/CS): Brand: CYPRESS

## (undated) DEVICE — ANTIBACTERIAL UNDYED BRAIDED (POLYGLACTIN 910), SYNTHETIC ABSORBABLE SUTURE: Brand: COATED VICRYL

## (undated) DEVICE — U-DRAPE: Brand: CONVERTORS

## (undated) DEVICE — PLUMEPEN PRO 10FT